# Patient Record
Sex: FEMALE | Race: WHITE | NOT HISPANIC OR LATINO | URBAN - METROPOLITAN AREA
[De-identification: names, ages, dates, MRNs, and addresses within clinical notes are randomized per-mention and may not be internally consistent; named-entity substitution may affect disease eponyms.]

---

## 2020-06-10 ENCOUNTER — OFFICE VISIT (OUTPATIENT)
Dept: URBAN - METROPOLITAN AREA CLINIC 113 | Facility: CLINIC | Age: 60
End: 2020-06-10

## 2020-06-15 ENCOUNTER — OFFICE VISIT (OUTPATIENT)
Dept: URBAN - METROPOLITAN AREA CLINIC 13 | Facility: CLINIC | Age: 60
End: 2020-06-15

## 2020-06-17 ENCOUNTER — OFFICE VISIT (OUTPATIENT)
Dept: URBAN - METROPOLITAN AREA CLINIC 113 | Facility: CLINIC | Age: 60
End: 2020-06-17

## 2020-06-26 ENCOUNTER — OFFICE VISIT (OUTPATIENT)
Dept: URBAN - METROPOLITAN AREA CLINIC 13 | Facility: CLINIC | Age: 60
End: 2020-06-26

## 2020-07-25 ENCOUNTER — TELEPHONE ENCOUNTER (OUTPATIENT)
Dept: URBAN - METROPOLITAN AREA CLINIC 13 | Facility: CLINIC | Age: 60
End: 2020-07-25

## 2020-07-25 RX ORDER — POLYETHYLENE GLYCOL 3350, SODIUM CHLORIDE, SODIUM BICARBONATE AND POTASSIUM CHLORIDE WITH LEMON FLAVOR 420; 11.2; 5.72; 1.48 G/4L; G/4L; G/4L; G/4L
DRINK 32 OUNCES AT 5PM DAY BEFORE PROCEDURE AND 6 HOURS PRIOR POWDER, FOR SOLUTION ORAL
Qty: 1 | Refills: 0 | OUTPATIENT
Start: 2013-11-06 | End: 2013-12-02

## 2020-07-25 RX ORDER — LORAZEPAM 2 MG/1
TAKE 1 TABLET BEDTIME TABLET ORAL
Refills: 0 | OUTPATIENT
End: 2016-03-24

## 2020-07-25 RX ORDER — NORTRIPTYLINE HYDROCHLORIDE 75 MG/1
TAKE 1 CAPSULE DAILY CAPSULE ORAL
Refills: 0 | OUTPATIENT
End: 2016-06-07

## 2020-07-25 RX ORDER — OXYCODONE HYDROCHLORIDE AND ACETAMINOPHEN 10; 325 MG/1; MG/1
TAKE 1 TABLET 4 TIMES DAILY AS NEEDED FOR PAIN TABLET ORAL
Refills: 0 | OUTPATIENT
End: 2020-06-15

## 2020-07-25 RX ORDER — PREDNISONE 20 MG/1
TAKE 2 TABLETS EVERY 8 HOURS X 3 DOSES; LAST DOSE DUE 1 HOUR PRIOR TO STUDY TABLET ORAL
Qty: 6 | Refills: 0 | OUTPATIENT
Start: 2019-01-21 | End: 2020-06-15

## 2020-07-25 RX ORDER — MELOXICAM 15 MG/1
TABLET ORAL
Qty: 10 | Refills: 0 | OUTPATIENT
Start: 2013-12-08 | End: 2016-03-24

## 2020-07-25 RX ORDER — LORAZEPAM 2 MG/1
TAKE 1 TABLET BEDTIME TABLET ORAL
Refills: 0 | OUTPATIENT
End: 2018-08-31

## 2020-07-25 RX ORDER — NYSTATIN 100000 [USP'U]/G
POWDER TOPICAL
Qty: 60 | Refills: 0 | OUTPATIENT
Start: 2018-11-12 | End: 2020-06-15

## 2020-07-25 RX ORDER — HYDROCORTISONE ACETATE 25 MG/1
INSERT 1 SUPP BEDTIME FOR 5-7 DAYS SUPPOSITORY RECTAL
Qty: 12 | Refills: 1 | OUTPATIENT
Start: 2017-03-28 | End: 2018-08-31

## 2020-07-25 RX ORDER — OXCARBAZEPINE 150 MG
TAKE 1 TABLET 3 TIMES DAILY TABLET ORAL
Refills: 0 | OUTPATIENT
End: 2014-02-11

## 2020-07-25 RX ORDER — LUBIPROSTONE 24 UG/1
TAKE (1) CAPSULE BY MOUTH TWICE DAILY WITH FOOD CAPSULE, GELATIN COATED ORAL
Qty: 60 | Refills: 11 | OUTPATIENT
Start: 2018-08-31 | End: 2020-06-15

## 2020-07-25 RX ORDER — GUANFACINE 4 MG/1
TABLET, EXTENDED RELEASE ORAL
Qty: 30 | Refills: 0 | OUTPATIENT
Start: 2014-01-22 | End: 2016-03-24

## 2020-07-25 RX ORDER — HYOSCYAMINE SULFATE 0.12 MG/1
PLACE 1-2 TABLET EVERY 6 HOURS PRN ABD PAIN TABLET, ORALLY DISINTEGRATING ORAL
Qty: 45 | Refills: 4 | OUTPATIENT
Start: 2013-12-02 | End: 2016-03-24

## 2020-07-25 RX ORDER — LUBIPROSTONE 24 UG/1
TAKE (1) CAPSULE BY MOUTH TWICE DAILY CAPSULE, GELATIN COATED ORAL
Qty: 60 | Refills: 1 | OUTPATIENT
Start: 2016-03-25 | End: 2018-08-31

## 2020-07-25 RX ORDER — TRAZODONE HYDROCHLORIDE 300 MG/1
TAKE 1 TABLET EVERY 12 HOURS DAILY TABLET ORAL
Refills: 0 | OUTPATIENT
Start: 2009-06-09 | End: 2013-10-01

## 2020-07-25 RX ORDER — PREGABALIN 100 MG
TAKE 1 CAPSULE AT BEDTIME CAPSULE ORAL
Refills: 0 | OUTPATIENT
End: 2016-03-24

## 2020-07-25 RX ORDER — POLYETHYLENE GLYCOL 3350, SODIUM CHLORIDE, SODIUM BICARBONATE AND POTASSIUM CHLORIDE WITH LEMON FLAVOR 420; 11.2; 5.72; 1.48 G/4L; G/4L; G/4L; G/4L
TAKE AS DIRECTED POWDER, FOR SOLUTION ORAL
Qty: 1 | Refills: 0 | OUTPATIENT
Start: 2020-03-24 | End: 2020-06-15

## 2020-07-25 RX ORDER — UMECLIDINIUM BROMIDE AND VILANTEROL TRIFENATATE 62.5; 25 UG/1; UG/1
INHALE 1 PUFFS TWICE DAILY POWDER RESPIRATORY (INHALATION)
Refills: 0 | OUTPATIENT
Start: 2019-04-02 | End: 2020-06-15

## 2020-07-25 RX ORDER — HYOSCYAMINE SULFATE 0.12 MG/1
PLACE 1 TABLET EVERY 4 HOURS PRN ABD PAIN TABLET, ORALLY DISINTEGRATING ORAL
Qty: 40 | Refills: 6 | OUTPATIENT
Start: 2014-02-17 | End: 2016-03-24

## 2020-07-25 RX ORDER — CLONAZEPAM 1 MG/1
TAKE 2 TABLETS AT BEDTIME TABLET ORAL
Refills: 0 | OUTPATIENT
End: 2016-06-07

## 2020-07-25 RX ORDER — DOCUSATE SODIUM 100 MG/1
TAKE 1 CAPSULE DAILY CAPSULE ORAL
Refills: 0 | OUTPATIENT
End: 2017-02-02

## 2020-07-25 RX ORDER — DIPHENHYDRAMINE HYDROCHLORIDE 25 MG/1
TAKE 1 HOUR PRIOR TO STUDY TABLET, FILM COATED ORAL
Qty: 1 | Refills: 0 | OUTPATIENT
Start: 2019-01-21 | End: 2020-06-15

## 2020-07-25 RX ORDER — NORTRIPTYLINE HCL 25 MG
TAKE CAPSULE  50 MG DAYTIME 25 MG BEDTIME CAPSULE ORAL
Refills: 0 | OUTPATIENT
End: 2020-06-15

## 2020-07-25 RX ORDER — BUDESONIDE AND FORMOTEROL FUMARATE DIHYDRATE 160; 4.5 UG/1; UG/1
INHALE 2 PUFFS TWICE DAILY. RINSE MOUTH AFTER USE AEROSOL RESPIRATORY (INHALATION)
Refills: 0 | OUTPATIENT
Start: 2018-03-30 | End: 2020-06-15

## 2020-07-25 RX ORDER — LINACLOTIDE 290 UG/1
TAKE 1 CAPSULE DAILY CAPSULE, GELATIN COATED ORAL
Qty: 30 | Refills: 5 | OUTPATIENT
Start: 2020-03-24 | End: 2020-06-17

## 2020-07-25 RX ORDER — ESOMEPRAZOLE MAGNESIUM 40 MG
TAKE 1 CAPSULE DAILY CAPSULE,DELAYED RELEASE (ENTERIC COATED) ORAL
Qty: 30 | Refills: 2 | OUTPATIENT
Start: 2013-10-01 | End: 2016-03-24

## 2020-07-25 RX ORDER — TRIAMCINOLONE ACETONIDE 1 MG/G
CREAM TOPICAL
Qty: 454 | Refills: 0 | OUTPATIENT
Start: 2018-12-12 | End: 2020-06-15

## 2020-07-25 RX ORDER — HYDROCODONE/ACETAMINOPHEN 10MG-325MG
TAKE 1 TABLET EVERY 6 HOURS AS NEEDED FOR PAIN TABLET ORAL
Refills: 0 | OUTPATIENT
End: 2016-03-24

## 2020-07-26 ENCOUNTER — TELEPHONE ENCOUNTER (OUTPATIENT)
Dept: URBAN - METROPOLITAN AREA CLINIC 13 | Facility: CLINIC | Age: 60
End: 2020-07-26

## 2020-07-26 RX ORDER — OFLOXACIN 3 MG/ML
SOLUTION AURICULAR (OTIC)
Qty: 5 | Refills: 0 | Status: ACTIVE | COMMUNITY
Start: 2013-01-08

## 2020-07-26 RX ORDER — FLUCONAZOLE 150 MG/1
TABLET ORAL
Qty: 1 | Refills: 0 | Status: ACTIVE | COMMUNITY
Start: 2012-12-04

## 2020-07-26 RX ORDER — MONTELUKAST SODIUM 10 MG/1
TAKE 1 TABLET DAILY TABLET, FILM COATED ORAL
Refills: 0 | Status: ACTIVE | COMMUNITY
Start: 2018-10-31

## 2020-07-26 RX ORDER — LURASIDONE HYDROCHLORIDE 40 MG/1
TABLET, FILM COATED ORAL
Qty: 30 | Refills: 0 | Status: ACTIVE | COMMUNITY
Start: 2013-04-09

## 2020-07-26 RX ORDER — FLUTICASONE FUROATE, UMECLIDINIUM BROMIDE AND VILANTEROL TRIFENATATE 100; 62.5; 25 UG/1; UG/1; UG/1
INHALE 1 PUFFS DAILY POWDER RESPIRATORY (INHALATION)
Refills: 0 | Status: ACTIVE | COMMUNITY

## 2020-07-26 RX ORDER — OXYCODONE AND ACETAMINOPHEN 7.5; 325 MG/1; MG/1
TABLET ORAL
Qty: 45 | Refills: 0 | Status: ACTIVE | COMMUNITY
Start: 2013-10-09

## 2020-07-26 RX ORDER — BACLOFEN 10 MG/1
TAKE 1 TABLET 3 TIMES DAILY TABLET ORAL
Refills: 0 | Status: ACTIVE | COMMUNITY
Start: 2018-10-01

## 2020-07-26 RX ORDER — MELOXICAM 15 MG/1
TABLET ORAL
Qty: 10 | Refills: 0 | Status: ACTIVE | COMMUNITY
Start: 2013-12-08

## 2020-07-26 RX ORDER — PREDNISOLONE ACETATE 10 MG/ML
SUSPENSION/ DROPS OPHTHALMIC
Qty: 5 | Refills: 0 | Status: ACTIVE | COMMUNITY
Start: 2013-01-08

## 2020-07-26 RX ORDER — CLONAZEPAM 1 MG/1
TABLET ORAL
Qty: 30 | Refills: 0 | Status: ACTIVE | COMMUNITY
Start: 2013-01-31

## 2020-07-26 RX ORDER — PREGABALIN 150 MG
CAPSULE ORAL
Qty: 30 | Refills: 0 | Status: ACTIVE | COMMUNITY
Start: 2013-06-04

## 2020-07-26 RX ORDER — DOXYCYCLINE 100 MG/1
CAPSULE ORAL
Qty: 14 | Refills: 0 | Status: ACTIVE | COMMUNITY
Start: 2012-10-16

## 2020-07-26 RX ORDER — LEVOTHYROXINE SODIUM 0.07 MG/1
TABLET ORAL
Qty: 15 | Refills: 0 | Status: ACTIVE | COMMUNITY
Start: 2018-11-01

## 2020-07-26 RX ORDER — HYDROCORTISONE 20 MG/1
TAKE 1 TABLET TWICE DAILY TABLET ORAL
Refills: 0 | Status: ACTIVE | COMMUNITY

## 2020-07-26 RX ORDER — PREGABALIN 150 MG
CAPSULE ORAL
Qty: 30 | Refills: 0 | Status: ACTIVE | COMMUNITY
Start: 2012-11-30

## 2020-07-26 RX ORDER — AMOXICILLIN 500 MG/1
CAPSULE ORAL
Qty: 30 | Refills: 0 | Status: ACTIVE | COMMUNITY
Start: 2013-01-10

## 2020-07-26 RX ORDER — ESZOPICLONE 3 MG/1
TABLET, COATED ORAL
Qty: 30 | Refills: 0 | Status: ACTIVE | COMMUNITY
Start: 2012-10-19

## 2020-07-26 RX ORDER — PREDNISONE 10 MG/1
TAKE 1 TABLET 3 TIMES DAILY THREE TABLETS A DAY FOR 3 DAYS LAST TAB 1 HOUR PRIOR TO EXAM TABLET ORAL
Qty: 10 | Refills: 0 | Status: ACTIVE | COMMUNITY
Start: 2020-06-22

## 2020-07-26 RX ORDER — DOXYCYCLINE HYCLATE 100 MG/1
TABLET ORAL
Qty: 14 | Refills: 0 | Status: ACTIVE | COMMUNITY
Start: 2013-01-28

## 2020-07-26 RX ORDER — PREGABALIN 150 MG
CAPSULE ORAL
Qty: 60 | Refills: 0 | Status: ACTIVE | COMMUNITY
Start: 2012-10-22

## 2020-07-26 RX ORDER — GABAPENTIN 300 MG/1
TAKE 1 CAPSULE 3 TIMES DAILY CAPSULE ORAL
Refills: 0 | Status: ACTIVE | COMMUNITY

## 2020-07-26 RX ORDER — DICLOFENAC SODIUM 1 MG/ML
SOLUTION OPHTHALMIC
Qty: 5 | Refills: 0 | Status: ACTIVE | COMMUNITY
Start: 2012-12-12

## 2020-07-26 RX ORDER — PREDNISONE 10 MG/1
TAKE 4 TABLETS DAILY TABLET ORAL
Qty: 120 | Refills: 11 | Status: ACTIVE | COMMUNITY

## 2020-07-26 RX ORDER — FAMOTIDINE 40 MG/1
TAKE ONE TABLET BY MOUTH EVERY NIGHT AT BEDTIME TABLET ORAL
Qty: 90 | Refills: 3 | Status: ACTIVE | COMMUNITY
Start: 2016-06-07

## 2020-07-26 RX ORDER — FLUTICASONE PROPIONATE 50 UG/1
SPRAY, METERED NASAL
Qty: 16 | Refills: 0 | Status: ACTIVE | COMMUNITY
Start: 2013-08-21

## 2020-07-26 RX ORDER — DICLOFENAC SODIUM 1 MG/ML
SOLUTION OPHTHALMIC
Qty: 5 | Refills: 0 | Status: ACTIVE | COMMUNITY
Start: 2013-01-08

## 2020-07-26 RX ORDER — CITALOPRAM 20 MG/1
TABLET ORAL
Qty: 30 | Refills: 0 | Status: ACTIVE | COMMUNITY
Start: 2018-11-20

## 2020-07-26 RX ORDER — BLOOD-GLUCOSE METER
USE AS DIRECTED EACH MISCELLANEOUS
Qty: 1 | Refills: 0 | Status: ACTIVE | COMMUNITY
Start: 2019-09-24

## 2020-07-26 RX ORDER — DOXYCYCLINE HYCLATE 100 MG/1
TABLET ORAL
Qty: 14 | Refills: 0 | Status: ACTIVE | COMMUNITY
Start: 2014-01-14

## 2020-07-26 RX ORDER — AMOXICILLIN AND CLAVULANATE POTASSIUM 875; 125 MG/1; MG/1
TABLET, FILM COATED ORAL
Qty: 20 | Refills: 0 | Status: ACTIVE | COMMUNITY
Start: 2012-11-30

## 2020-07-26 RX ORDER — TOPIRAMATE 100 MG/1
TABLET, FILM COATED ORAL
Qty: 60 | Refills: 0 | Status: ACTIVE | COMMUNITY
Start: 2013-01-31

## 2020-07-26 RX ORDER — GABAPENTIN 300 MG/1
CAPSULE ORAL
Qty: 90 | Refills: 0 | Status: ACTIVE | COMMUNITY
Start: 2012-08-17

## 2020-07-26 RX ORDER — OLANZAPINE 5 MG/1
TAKE 1 TABLET 4 TIMES DAILY TABLET ORAL
Refills: 0 | Status: ACTIVE | COMMUNITY

## 2020-07-26 RX ORDER — MORPHINE SULFATE 15 MG/1
TABLET, FILM COATED, EXTENDED RELEASE ORAL
Qty: 60 | Refills: 0 | Status: ACTIVE | COMMUNITY
Start: 2013-05-29

## 2020-07-26 RX ORDER — LORAZEPAM 1 MG/1
TABLET ORAL
Qty: 45 | Refills: 0 | Status: ACTIVE | COMMUNITY
Start: 2014-01-06

## 2020-07-26 RX ORDER — PREDNISOLONE ACETATE 10 MG/ML
SUSPENSION/ DROPS OPHTHALMIC
Qty: 10 | Refills: 0 | Status: ACTIVE | COMMUNITY
Start: 2012-12-12

## 2020-07-26 RX ORDER — BLOOD SUGAR DIAGNOSTIC
TEST TWO TIMES DAILY STRIP MISCELLANEOUS
Qty: 175 | Refills: 0 | Status: ACTIVE | COMMUNITY
Start: 2019-10-28

## 2020-07-26 RX ORDER — BUDESONIDE AND FORMOTEROL FUMARATE DIHYDRATE 160; 4.5 UG/1; UG/1
AEROSOL RESPIRATORY (INHALATION)
Qty: 10 | Refills: 0 | Status: ACTIVE | COMMUNITY
Start: 2012-09-25

## 2020-07-26 RX ORDER — ESZOPICLONE 3 MG/1
TABLET, COATED ORAL
Qty: 30 | Refills: 0 | Status: ACTIVE | COMMUNITY
Start: 2012-08-17

## 2020-07-26 RX ORDER — AMOXICILLIN 500 MG/1
CAPSULE ORAL
Qty: 14 | Refills: 0 | Status: ACTIVE | COMMUNITY
Start: 2013-01-08

## 2020-07-26 RX ORDER — POTASSIUM CHLORIDE 20 MEQ/1
TABLET, EXTENDED RELEASE ORAL
Qty: 30 | Refills: 0 | Status: ACTIVE | COMMUNITY
Start: 2012-06-12

## 2020-07-26 RX ORDER — GABAPENTIN 400 MG/1
CAPSULE ORAL
Qty: 150 | Refills: 0 | Status: ACTIVE | COMMUNITY
Start: 2014-01-06

## 2020-07-26 RX ORDER — OXYCODONE HCL/ACETAMINOPHEN 7.5-325 MG
TABLET ORAL
Qty: 60 | Refills: 0 | Status: ACTIVE | COMMUNITY
Start: 2013-06-14

## 2020-07-26 RX ORDER — LEVOFLOXACIN 500 MG/1
TABLET, FILM COATED ORAL
Qty: 10 | Refills: 0 | Status: ACTIVE | COMMUNITY
Start: 2019-02-25

## 2020-07-26 RX ORDER — OLANZAPINE 5 MG/1
TABLET, FILM COATED ORAL
Qty: 30 | Refills: 0 | Status: ACTIVE | COMMUNITY
Start: 2019-01-07

## 2020-07-26 RX ORDER — GUANFACINE 4 MG/1
TABLET, EXTENDED RELEASE ORAL
Qty: 30 | Refills: 0 | Status: ACTIVE | COMMUNITY
Start: 2012-10-22

## 2020-07-26 RX ORDER — HYDROCORTISONE 10 MG/1
TABLET ORAL
Qty: 60 | Refills: 0 | Status: ACTIVE | COMMUNITY
Start: 2018-10-29

## 2020-07-26 RX ORDER — SUCRALFATE 1 G/1
TAKE 1 TABLET EVERY 6 HOURS AS NEEDED FOR ABDOMINAL PAIN TABLET ORAL
Qty: 120 | Refills: 3 | Status: ACTIVE | COMMUNITY
Start: 2018-12-20

## 2020-07-26 RX ORDER — LEVOTHYROXINE SODIUM 0.15 MG/1
TAKE 1 TABLET DAILY TABLET ORAL
Refills: 0 | Status: ACTIVE | COMMUNITY

## 2020-07-26 RX ORDER — ALBUTEROL SULFATE 90 UG/1
AEROSOL, METERED RESPIRATORY (INHALATION)
Qty: 36 | Refills: 0 | Status: ACTIVE | COMMUNITY
Start: 2012-09-25

## 2020-07-26 RX ORDER — IPRATROPIUM BROMIDE AND ALBUTEROL 20; 100 UG/1; UG/1
SPRAY, METERED RESPIRATORY (INHALATION)
Qty: 12 | Refills: 0 | Status: ACTIVE | COMMUNITY
Start: 2013-03-06

## 2020-07-26 RX ORDER — OXCARBAZEPINE 600 MG/1
TABLET, FILM COATED ORAL
Qty: 90 | Refills: 0 | Status: ACTIVE | COMMUNITY
Start: 2012-10-19

## 2020-07-26 RX ORDER — ISOPROPYL ALCOHOL 70 ML/100ML
USE TWO TIMES DAILY SWAB TOPICAL
Qty: 200 | Refills: 0 | Status: ACTIVE | COMMUNITY
Start: 2019-10-28

## 2020-07-26 RX ORDER — OXCARBAZEPINE 150 MG/1
TABLET, FILM COATED ORAL
Qty: 150 | Refills: 0 | Status: ACTIVE | COMMUNITY
Start: 2013-09-27

## 2020-07-26 RX ORDER — CICLESONIDE 37 UG/1
AEROSOL, METERED NASAL
Qty: 6 | Refills: 0 | Status: ACTIVE | COMMUNITY
Start: 2013-08-21

## 2020-07-26 RX ORDER — OFLOXACIN 3 MG/ML
SOLUTION AURICULAR (OTIC)
Qty: 5 | Refills: 0 | Status: ACTIVE | COMMUNITY
Start: 2012-12-12

## 2020-07-26 RX ORDER — TOPIRAMATE 200 MG/1
TABLET ORAL
Qty: 60 | Refills: 0 | Status: ACTIVE | COMMUNITY
Start: 2013-02-15

## 2020-07-26 RX ORDER — TIOTROPIUM BROMIDE AND OLODATEROL 3.124; 2.736 UG/1; UG/1
INHALE 1 PUFFS DAILY SPRAY, METERED RESPIRATORY (INHALATION)
Refills: 0 | Status: ACTIVE | COMMUNITY
Start: 2018-03-30

## 2020-07-26 RX ORDER — DOXYCYCLINE 100 MG/1
CAPSULE ORAL
Qty: 10 | Refills: 0 | Status: ACTIVE | COMMUNITY
Start: 2012-12-03

## 2020-07-26 RX ORDER — PANTOPRAZOLE SODIUM 40 MG
TAKE 1 TABLET BY MOUTH TWICE DAILY TABLET, DELAYED RELEASE (ENTERIC COATED) ORAL
Qty: 180 | Refills: 3 | Status: ACTIVE | COMMUNITY
Start: 2016-04-21

## 2020-07-26 RX ORDER — DIPHENHYDRAMINE HYDROCHLORIDE 25 MG/1
TAKE 1 CAPSULE 3 TIMES DAILY 3 TIMES A DAY FOR 3 DAYS. TAKE THE LAST TAB 1 HOUR PRIOR TO EXAM CAPSULE ORAL
Qty: 10 | Refills: 0 | Status: ACTIVE | COMMUNITY
Start: 2020-06-22

## 2020-07-26 RX ORDER — METOPROLOL SUCCINATE 25 MG/1
TABLET, EXTENDED RELEASE ORAL
Qty: 30 | Refills: 0 | Status: ACTIVE | COMMUNITY
Start: 2013-10-23

## 2020-07-26 RX ORDER — MELOXICAM 7.5 MG/1
TABLET ORAL
Qty: 60 | Refills: 0 | Status: ACTIVE | COMMUNITY
Start: 2013-12-30

## 2020-07-26 RX ORDER — TOPIRAMATE 100 MG/1
TABLET, FILM COATED ORAL
Qty: 60 | Refills: 0 | Status: ACTIVE | COMMUNITY
Start: 2012-10-19

## 2020-07-26 RX ORDER — IPRATROPIUM BROMIDE AND ALBUTEROL 20; 100 UG/1; UG/1
SPRAY, METERED RESPIRATORY (INHALATION)
Qty: 36 | Refills: 0 | Status: ACTIVE | COMMUNITY
Start: 2013-07-30

## 2020-07-26 RX ORDER — LEVOFLOXACIN 750 MG/1
TABLET, FILM COATED ORAL
Qty: 5 | Refills: 0 | Status: ACTIVE | COMMUNITY
Start: 2019-02-08

## 2020-07-26 RX ORDER — MIRTAZAPINE 15 MG/1
TABLET, FILM COATED ORAL
Qty: 30 | Refills: 0 | Status: ACTIVE | COMMUNITY
Start: 2013-01-31

## 2020-07-26 RX ORDER — FUROSEMIDE 40 MG/1
TABLET ORAL
Qty: 30 | Refills: 0 | Status: ACTIVE | COMMUNITY
Start: 2012-06-12

## 2020-07-26 RX ORDER — CITALOPRAM HYDROBROMIDE 10 MG
TAKE 1 TABLET DAILY TABLET ORAL
Qty: 30 | Refills: 5 | Status: ACTIVE | COMMUNITY

## 2020-07-26 RX ORDER — BUDESONIDE AND FORMOTEROL FUMARATE DIHYDRATE 160; 4.5 UG/1; UG/1
AEROSOL RESPIRATORY (INHALATION)
Qty: 30 | Refills: 0 | Status: ACTIVE | COMMUNITY
Start: 2013-07-01

## 2020-07-26 RX ORDER — IPRATROPIUM BROMIDE AND ALBUTEROL 20; 100 UG/1; UG/1
SPRAY, METERED RESPIRATORY (INHALATION)
Qty: 20 | Refills: 0 | Status: ACTIVE | COMMUNITY
Start: 2013-07-01

## 2020-07-26 RX ORDER — GUANFACINE 4 MG/1
TABLET, EXTENDED RELEASE ORAL
Qty: 30 | Refills: 0 | Status: ACTIVE | COMMUNITY
Start: 2013-01-31

## 2020-07-26 RX ORDER — LORAZEPAM 2 MG/1
TABLET ORAL
Qty: 30 | Refills: 0 | Status: ACTIVE | COMMUNITY
Start: 2013-01-31

## 2020-07-26 RX ORDER — CEPHALEXIN 500 MG/1
CAPSULE ORAL
Qty: 28 | Refills: 0 | Status: ACTIVE | COMMUNITY
Start: 2013-05-13

## 2020-07-26 RX ORDER — FAMOTIDINE 20 MG/1
TAKE (1) TABLET BY MOUTH AT BEDTIME TABLET ORAL
Qty: 30 | Refills: 11 | Status: ACTIVE | COMMUNITY
Start: 2017-05-26

## 2020-07-26 RX ORDER — SULFAMETHOXAZOLE AND TRIMETHOPRIM 800; 160 MG/1; MG/1
TABLET ORAL
Qty: 20 | Refills: 0 | Status: ACTIVE | COMMUNITY
Start: 2019-03-04

## 2020-07-26 RX ORDER — FLUCONAZOLE 150 MG/1
TABLET ORAL
Qty: 2 | Refills: 0 | Status: ACTIVE | COMMUNITY
Start: 2013-01-10

## 2020-07-26 RX ORDER — HYDROCODONE BITARTRATE AND ACETAMINOPHEN 10; 325 MG/1; MG/1
TABLET ORAL
Qty: 90 | Refills: 0 | Status: ACTIVE | COMMUNITY
Start: 2012-07-30

## 2020-07-26 RX ORDER — AMLODIPINE BESYLATE 5 MG/1
TABLET ORAL
Qty: 30 | Refills: 0 | Status: ACTIVE | COMMUNITY
Start: 2013-01-10

## 2020-07-26 RX ORDER — OXYCODONE HYDROCHLORIDE 10 MG/1
TAKE 1 TABLET EVERY 4 HOURS PRN TABLET ORAL
Refills: 0 | Status: ACTIVE | COMMUNITY
Start: 2018-10-31

## 2020-07-26 RX ORDER — PREDNISONE 10 MG/1
TABLET ORAL
Qty: 15 | Refills: 0 | Status: ACTIVE | COMMUNITY
Start: 2013-10-24

## 2020-07-26 RX ORDER — LEVOTHYROXINE SODIUM 0.07 MG/1
TABLET ORAL
Qty: 15 | Refills: 0 | Status: ACTIVE | COMMUNITY
Start: 2019-01-22

## 2020-07-26 RX ORDER — GABAPENTIN 600 MG/1
TABLET, FILM COATED ORAL
Qty: 120 | Refills: 0 | Status: ACTIVE | COMMUNITY
Start: 2012-10-19

## 2020-07-26 RX ORDER — ALBUTEROL SULFATE 90 UG/1
INHALE 2 PUFFS 3 TIMES DAILY AS NEEDED AEROSOL, METERED RESPIRATORY (INHALATION)
Refills: 0 | Status: ACTIVE | COMMUNITY

## 2020-07-26 RX ORDER — FUROSEMIDE 40 MG/1
TAKE 1 TABLET DAILY TABLET ORAL
Refills: 0 | Status: ACTIVE | COMMUNITY

## 2020-08-13 ENCOUNTER — ERX REFILL RESPONSE (OUTPATIENT)
Age: 60
End: 2020-08-13

## 2020-08-13 RX ORDER — FAMOTIDINE 20 MG/1
TAKE (1) TABLET BY MOUTH AT BEDTIME TABLET ORAL
Qty: 30 | Refills: 0

## 2020-08-17 ENCOUNTER — OFFICE VISIT (OUTPATIENT)
Dept: URBAN - METROPOLITAN AREA CLINIC 113 | Facility: CLINIC | Age: 60
End: 2020-08-17

## 2020-08-31 ENCOUNTER — OFFICE VISIT (OUTPATIENT)
Dept: URBAN - METROPOLITAN AREA CLINIC 113 | Facility: CLINIC | Age: 60
End: 2020-08-31
Payer: COMMERCIAL

## 2020-08-31 VITALS
HEIGHT: 68 IN | RESPIRATION RATE: 18 BRPM | HEART RATE: 82 BPM | BODY MASS INDEX: 36.68 KG/M2 | SYSTOLIC BLOOD PRESSURE: 113 MMHG | TEMPERATURE: 97.7 F | WEIGHT: 242 LBS | DIASTOLIC BLOOD PRESSURE: 74 MMHG

## 2020-08-31 DIAGNOSIS — K59.00 ACUTE CONSTIPATION: ICD-10-CM

## 2020-08-31 PROCEDURE — 99213 OFFICE O/P EST LOW 20 MIN: CPT | Performed by: NURSE PRACTITIONER

## 2020-08-31 PROCEDURE — G8427 DOCREV CUR MEDS BY ELIG CLIN: HCPCS | Performed by: NURSE PRACTITIONER

## 2020-08-31 RX ORDER — DOXYCYCLINE HYCLATE 100 MG/1
TABLET ORAL
Qty: 14 | Refills: 0 | Status: DISCONTINUED | COMMUNITY
Start: 2013-01-28

## 2020-08-31 RX ORDER — HYDROCORTISONE 10 MG/1
TABLET ORAL
Qty: 60 | Refills: 0 | Status: DISCONTINUED | COMMUNITY
Start: 2018-10-29

## 2020-08-31 RX ORDER — FAMOTIDINE 20 MG/1
TAKE (1) TABLET BY MOUTH AT BEDTIME TABLET ORAL
Qty: 30 | Refills: 0 | Status: DISCONTINUED | COMMUNITY

## 2020-08-31 RX ORDER — LURASIDONE HYDROCHLORIDE 40 MG/1
TABLET, FILM COATED ORAL
Qty: 30 | Refills: 0 | Status: DISCONTINUED | COMMUNITY
Start: 2013-04-09

## 2020-08-31 RX ORDER — METOPROLOL SUCCINATE 25 MG/1
TABLET, EXTENDED RELEASE ORAL
Qty: 30 | Refills: 0 | Status: DISCONTINUED | COMMUNITY
Start: 2013-10-23

## 2020-08-31 RX ORDER — FUROSEMIDE 40 MG/1
TAKE 1 TABLET DAILY TABLET ORAL
Refills: 0 | Status: DISCONTINUED | COMMUNITY

## 2020-08-31 RX ORDER — MELOXICAM 7.5 MG/1
TABLET ORAL
Qty: 60 | Refills: 0 | Status: DISCONTINUED | COMMUNITY
Start: 2013-12-30

## 2020-08-31 RX ORDER — SULFAMETHOXAZOLE AND TRIMETHOPRIM 800; 160 MG/1; MG/1
TABLET ORAL
Qty: 20 | Refills: 0 | Status: DISCONTINUED | COMMUNITY
Start: 2019-03-04

## 2020-08-31 RX ORDER — CEPHALEXIN 500 MG/1
CAPSULE ORAL
Qty: 28 | Refills: 0 | Status: DISCONTINUED | COMMUNITY
Start: 2013-05-13

## 2020-08-31 RX ORDER — PREDNISONE 10 MG/1
1 TABLET TABLET ORAL ONCE A DAY
Refills: 11 | Status: ACTIVE | COMMUNITY

## 2020-08-31 RX ORDER — LINACLOTIDE 290 UG/1
TAKE 1 CAPSULE DAILY CAPSULE, GELATIN COATED ORAL ONCE A DAY
Status: ACTIVE | COMMUNITY

## 2020-08-31 RX ORDER — LEVOFLOXACIN 500 MG/1
TABLET, FILM COATED ORAL
Qty: 10 | Refills: 0 | Status: DISCONTINUED | COMMUNITY
Start: 2019-02-25

## 2020-08-31 RX ORDER — MORPHINE SULFATE 15 MG/1
TABLET, FILM COATED, EXTENDED RELEASE ORAL
Qty: 60 | Refills: 0 | Status: DISCONTINUED | COMMUNITY
Start: 2013-05-29

## 2020-08-31 RX ORDER — SUCRALFATE 1 G/1
1 TABLET ON AN EMPTY STOMACH TABLET ORAL TWICE A DAY
Refills: 3 | Status: ACTIVE | COMMUNITY
Start: 2018-12-20

## 2020-08-31 RX ORDER — LEVOFLOXACIN 750 MG/1
TABLET, FILM COATED ORAL
Qty: 5 | Refills: 0 | Status: DISCONTINUED | COMMUNITY
Start: 2019-02-08

## 2020-08-31 RX ORDER — BLOOD SUGAR DIAGNOSTIC
TEST TWO TIMES DAILY STRIP MISCELLANEOUS
Qty: 175 | Refills: 0 | Status: DISCONTINUED | COMMUNITY
Start: 2019-10-28

## 2020-08-31 RX ORDER — CITALOPRAM HYDROBROMIDE 10 MG
1 TABLET TABLET ORAL ONCE A DAY
Refills: 5 | Status: ACTIVE | COMMUNITY

## 2020-08-31 RX ORDER — OFLOXACIN 3 MG/ML
SOLUTION AURICULAR (OTIC)
Qty: 5 | Refills: 0 | Status: DISCONTINUED | COMMUNITY
Start: 2012-12-12

## 2020-08-31 RX ORDER — ESZOPICLONE 3 MG/1
TABLET, COATED ORAL
Qty: 30 | Refills: 0 | Status: DISCONTINUED | COMMUNITY
Start: 2012-08-17

## 2020-08-31 RX ORDER — TOPIRAMATE 200 MG/1
TABLET ORAL
Qty: 60 | Refills: 0 | Status: DISCONTINUED | COMMUNITY
Start: 2013-02-15

## 2020-08-31 RX ORDER — MONTELUKAST SODIUM 10 MG/1
TAKE 1 TABLET DAILY TABLET, FILM COATED ORAL
Refills: 0 | Status: ACTIVE | COMMUNITY
Start: 2018-10-31

## 2020-08-31 RX ORDER — OXYCODONE AND ACETAMINOPHEN 7.5; 325 MG/1; MG/1
TABLET ORAL
Qty: 45 | Refills: 0 | Status: DISCONTINUED | COMMUNITY
Start: 2013-10-09

## 2020-08-31 RX ORDER — OLANZAPINE 5 MG/1
TAKE 1 TABLET 4 TIMES DAILY TABLET ORAL
Refills: 0 | Status: ACTIVE | COMMUNITY

## 2020-08-31 RX ORDER — PREDNISOLONE ACETATE 10 MG/ML
SUSPENSION/ DROPS OPHTHALMIC
Qty: 10 | Refills: 0 | Status: DISCONTINUED | COMMUNITY
Start: 2012-12-12

## 2020-08-31 RX ORDER — FLUTICASONE FUROATE, UMECLIDINIUM BROMIDE AND VILANTEROL TRIFENATATE 100; 62.5; 25 UG/1; UG/1; UG/1
1 PUFF POWDER RESPIRATORY (INHALATION) ONCE A DAY
Refills: 0 | Status: ACTIVE | COMMUNITY

## 2020-08-31 RX ORDER — GABAPENTIN 400 MG/1
CAPSULE ORAL
Qty: 150 | Refills: 0 | Status: DISCONTINUED | COMMUNITY
Start: 2014-01-06

## 2020-08-31 RX ORDER — GABAPENTIN 300 MG/1
CAPSULE ORAL
Qty: 90 | Refills: 0 | Status: DISCONTINUED | COMMUNITY
Start: 2012-08-17

## 2020-08-31 RX ORDER — OXCARBAZEPINE 150 MG/1
TABLET, FILM COATED ORAL
Qty: 150 | Refills: 0 | Status: DISCONTINUED | COMMUNITY
Start: 2013-09-27

## 2020-08-31 RX ORDER — FLUTICASONE PROPIONATE 50 UG/1
1 SPRAY IN EACH NOSTRIL SPRAY, METERED NASAL ONCE A DAY
Refills: 0 | Status: ACTIVE | COMMUNITY
Start: 2013-08-21

## 2020-08-31 RX ORDER — ISOPROPYL ALCOHOL 70 ML/100ML
USE TWO TIMES DAILY SWAB TOPICAL
Qty: 200 | Refills: 0 | Status: DISCONTINUED | COMMUNITY
Start: 2019-10-28

## 2020-08-31 RX ORDER — MOMETASONE FUROATE AND FORMOTEROL FUMARATE DIHYDRATE 200; 5 UG/1; UG/1
2 PUFFS AEROSOL RESPIRATORY (INHALATION) TWICE A DAY
Status: ACTIVE | COMMUNITY

## 2020-08-31 RX ORDER — BUDESONIDE AND FORMOTEROL FUMARATE DIHYDRATE 160; 4.5 UG/1; UG/1
AEROSOL RESPIRATORY (INHALATION)
Qty: 10 | Refills: 0 | Status: DISCONTINUED | COMMUNITY
Start: 2012-09-25

## 2020-08-31 RX ORDER — CLONAZEPAM 1 MG/1
TABLET ORAL
Qty: 30 | Refills: 0 | Status: DISCONTINUED | COMMUNITY
Start: 2013-01-31

## 2020-08-31 RX ORDER — GUANFACINE 4 MG/1
TABLET, EXTENDED RELEASE ORAL
Qty: 30 | Refills: 0 | Status: DISCONTINUED | COMMUNITY
Start: 2012-10-22

## 2020-08-31 RX ORDER — AMOXICILLIN AND CLAVULANATE POTASSIUM 875; 125 MG/1; MG/1
TABLET, FILM COATED ORAL
Qty: 20 | Refills: 0 | Status: DISCONTINUED | COMMUNITY
Start: 2012-11-30

## 2020-08-31 RX ORDER — NORTRIPTYLINE HYDROCHLORIDE 25 MG/1
1 CAPSULE CAPSULE ORAL ONCE A DAY
Status: ACTIVE | COMMUNITY

## 2020-08-31 RX ORDER — OXYCODONE HCL/ACETAMINOPHEN 7.5-325 MG
TABLET ORAL
Qty: 60 | Refills: 0 | Status: DISCONTINUED | COMMUNITY
Start: 2013-06-14

## 2020-08-31 RX ORDER — LORAZEPAM 2 MG/1
TABLET ORAL
Qty: 30 | Refills: 0 | Status: DISCONTINUED | COMMUNITY
Start: 2013-01-31

## 2020-08-31 RX ORDER — DICLOFENAC SODIUM 1 MG/ML
SOLUTION OPHTHALMIC
Qty: 5 | Refills: 0 | Status: DISCONTINUED | COMMUNITY
Start: 2012-12-12

## 2020-08-31 RX ORDER — HYDROCORTISONE 20 MG/1
TAKE 1 TABLET TWICE DAILY TABLET ORAL
Refills: 0 | Status: DISCONTINUED | COMMUNITY

## 2020-08-31 RX ORDER — FAMOTIDINE 40 MG/1
1 1/2 TABLETS TABLET ORAL ONCE A DAY
Refills: 3 | Status: ACTIVE | COMMUNITY
Start: 2016-06-07

## 2020-08-31 RX ORDER — POTASSIUM CHLORIDE 20 MEQ/1
TABLET, EXTENDED RELEASE ORAL
Qty: 30 | Refills: 0 | Status: DISCONTINUED | COMMUNITY
Start: 2012-06-12

## 2020-08-31 RX ORDER — HYDROCODONE BITARTRATE AND ACETAMINOPHEN 10; 325 MG/1; MG/1
TABLET ORAL
Qty: 90 | Refills: 0 | Status: DISCONTINUED | COMMUNITY
Start: 2012-07-30

## 2020-08-31 RX ORDER — LEVOTHYROXINE SODIUM 0.07 MG/1
TABLET ORAL
Qty: 15 | Refills: 0 | Status: DISCONTINUED | COMMUNITY
Start: 2018-11-01

## 2020-08-31 RX ORDER — PANTOPRAZOLE SODIUM 40 MG
1 TABLET TABLET, DELAYED RELEASE (ENTERIC COATED) ORAL
Refills: 3 | Status: ACTIVE | COMMUNITY
Start: 2016-04-21

## 2020-08-31 RX ORDER — IPRATROPIUM BROMIDE AND ALBUTEROL 20; 100 UG/1; UG/1
SPRAY, METERED RESPIRATORY (INHALATION)
Qty: 12 | Refills: 0 | Status: DISCONTINUED | COMMUNITY
Start: 2013-03-06

## 2020-08-31 RX ORDER — AMLODIPINE BESYLATE 5 MG/1
TABLET ORAL
Qty: 30 | Refills: 0 | Status: DISCONTINUED | COMMUNITY
Start: 2013-01-10

## 2020-08-31 RX ORDER — CICLESONIDE 37 UG/1
AEROSOL, METERED NASAL
Qty: 6 | Refills: 0 | Status: DISCONTINUED | COMMUNITY
Start: 2013-08-21

## 2020-08-31 RX ORDER — DOXYCYCLINE 100 MG/1
CAPSULE ORAL
Qty: 14 | Refills: 0 | Status: DISCONTINUED | COMMUNITY
Start: 2012-10-16

## 2020-08-31 RX ORDER — OXCARBAZEPINE 600 MG/1
TABLET, FILM COATED ORAL
Qty: 90 | Refills: 0 | Status: DISCONTINUED | COMMUNITY
Start: 2012-10-19

## 2020-08-31 RX ORDER — TOPIRAMATE 100 MG/1
TABLET, FILM COATED ORAL
Qty: 60 | Refills: 0 | Status: DISCONTINUED | COMMUNITY
Start: 2012-10-19

## 2020-08-31 RX ORDER — OXYCODONE HYDROCHLORIDE 10 MG/1
TAKE 1 TABLET EVERY 4 HOURS PRN TABLET ORAL
Refills: 0 | Status: ACTIVE | COMMUNITY
Start: 2018-10-31

## 2020-08-31 RX ORDER — CITALOPRAM 20 MG/1
TABLET ORAL
Qty: 30 | Refills: 0 | Status: DISCONTINUED | COMMUNITY
Start: 2018-11-20

## 2020-08-31 RX ORDER — FLUCONAZOLE 150 MG/1
TABLET ORAL
Qty: 1 | Refills: 0 | Status: DISCONTINUED | COMMUNITY
Start: 2012-12-04

## 2020-08-31 RX ORDER — MELOXICAM 15 MG/1
TABLET ORAL
Qty: 10 | Refills: 0 | Status: DISCONTINUED | COMMUNITY
Start: 2013-12-08

## 2020-08-31 RX ORDER — BACLOFEN 10 MG/1
TAKE 1 TABLET 3 TIMES DAILY TABLET ORAL
Refills: 0 | Status: ACTIVE | COMMUNITY
Start: 2018-10-01

## 2020-08-31 RX ORDER — OLANZAPINE 15 MG/1
1 TABLET TABLET, FILM COATED ORAL ONCE A DAY
Refills: 0 | Status: ACTIVE | COMMUNITY
Start: 2019-01-07

## 2020-08-31 RX ORDER — DICYCLOMINE HYDROCHLORIDE 20 MG/1
1 TABLET TABLET ORAL THREE TIMES A DAY
Status: ACTIVE | COMMUNITY

## 2020-08-31 RX ORDER — ALBUTEROL SULFATE 90 UG/1
INHALE 2 PUFFS 3 TIMES DAILY AS NEEDED AEROSOL, METERED RESPIRATORY (INHALATION)
Refills: 0 | Status: ACTIVE | COMMUNITY

## 2020-08-31 RX ORDER — SUCRALFATE 1 G
1 TABLET ON AN EMPTY STOMACH TABLET ORAL TWICE A DAY
Status: ACTIVE | COMMUNITY

## 2020-08-31 RX ORDER — FUROSEMIDE 40 MG/1
1 TABLET TABLET ORAL ONCE A DAY
Refills: 0 | Status: ACTIVE | COMMUNITY
Start: 2012-06-12

## 2020-08-31 RX ORDER — MIRTAZAPINE 15 MG/1
TABLET, FILM COATED ORAL
Qty: 30 | Refills: 0 | Status: DISCONTINUED | COMMUNITY
Start: 2013-01-31

## 2020-08-31 RX ORDER — DIPHENHYDRAMINE HYDROCHLORIDE 25 MG/1
TAKE 1 CAPSULE 3 TIMES DAILY 3 TIMES A DAY FOR 3 DAYS. TAKE THE LAST TAB 1 HOUR PRIOR TO EXAM CAPSULE ORAL
Qty: 10 | Refills: 0 | Status: DISCONTINUED | COMMUNITY
Start: 2020-06-22

## 2020-08-31 RX ORDER — NORTRIPTYLINE HYDROCHLORIDE 75 MG/1
1 CAPSULE CAPSULE ORAL ONCE A DAY
Status: ACTIVE | COMMUNITY

## 2020-08-31 RX ORDER — BLOOD-GLUCOSE METER
USE AS DIRECTED EACH MISCELLANEOUS
Qty: 1 | Refills: 0 | Status: DISCONTINUED | COMMUNITY
Start: 2019-09-24

## 2020-08-31 RX ORDER — AMOXICILLIN 500 MG/1
CAPSULE ORAL
Qty: 14 | Refills: 0 | Status: DISCONTINUED | COMMUNITY
Start: 2013-01-08

## 2020-08-31 RX ORDER — PREGABALIN 150 MG
CAPSULE ORAL
Qty: 60 | Refills: 0 | Status: DISCONTINUED | COMMUNITY
Start: 2012-10-22

## 2020-08-31 RX ORDER — LEVOTHYROXINE SODIUM 0.15 MG/1
TAKE 1 TABLET DAILY TABLET ORAL
Refills: 0 | Status: ACTIVE | COMMUNITY

## 2020-08-31 RX ORDER — LORAZEPAM 1 MG/1
TABLET ORAL
Qty: 45 | Refills: 0 | Status: DISCONTINUED | COMMUNITY
Start: 2014-01-06

## 2020-08-31 RX ORDER — GABAPENTIN 300 MG/1
TAKE 1 CAPSULE 3 TIMES DAILY CAPSULE ORAL
Refills: 0 | Status: ACTIVE | COMMUNITY

## 2020-08-31 RX ORDER — TIOTROPIUM BROMIDE AND OLODATEROL 3.124; 2.736 UG/1; UG/1
2 PUFFS SPRAY, METERED RESPIRATORY (INHALATION) ONCE A DAY
Refills: 0 | Status: ACTIVE | COMMUNITY
Start: 2018-03-30

## 2020-08-31 RX ORDER — GABAPENTIN 600 MG/1
TABLET, FILM COATED ORAL
Qty: 120 | Refills: 0 | Status: DISCONTINUED | COMMUNITY
Start: 2012-10-19

## 2020-08-31 NOTE — HPI-OTHER HISTORIES
Upper GI series was performed on 1/9/19 and revealed moderate gastroesophageal reflux. EGD 10/3/18: LA grade A reflux esophagitis at 39 cm status post biopsies revealing squamocolumnar mucosa with mild chronic inflammation negative for intestinal metaplasia or dysplasia. Small hiatal hernia. Status post empiric savory dilation to 48 Liberian. EGD otherwise normal to D2. Colonoscopy (3/23/17): BBPS score 9, a 3 mm ascending colon polyp, a 3 mm rectosigmoid colon polyp, significant looping of colon, moderate internal hemorrhoids.

## 2020-08-31 NOTE — HPI-TODAY'S VISIT:
59 year old female with a history of brain aneurysm x 2 with repair in 2015, adrenal insufficiency, thyroid nodules, GERD, Blanca's esophagus, adenomatous colon polyps, chronic constipation, gastroparesis, DM, COPD, PTSD, bipolar disorder, and panic attacks with agoraphobia, presenting for follow up.  She was last seen 6/17/20 for follow-up regarding constipation. Unfortunately, she could not tolerate Linzess due to the side effect of abdominal cramping, and Amitiza was both ineffective and cost-prohibitive. She was recommended a bowel regimen with MOM and MiraLAX. Given recent exacerbation of abdominal pain, labs and a CT were planned. Her GERD was managed with pantoprazole bid and famotidine prior to bedtime. CT of the abdomen and pelvis with contrast on 6/26/20: left adrenal adenoma, diverticulosis. Labs 6/25/20: CBC, CMP unremarkable. Lipase 15. TSH 0.73. Unfortunately, a daily regimen with milk of magnesia and MiraLAX was ineffective. She resumed Amitiza, which resulted in small-volume, incomplete stools. Subsequently, she reinitiated Linzess 290mcg daily, which initially was effective, resulting in a daily formed stool with infrequent diarrhea. However, within the last few weeks, she has experienced an exacerbation of constipation, and is having a hard, small-volume, straining stool every 3-4 days despite a regimen with Linzess 290mcg daily and milk of magnesia 2 tablespoons three times daily. She complains of postprandial upper abdominal bloating, fullness, and aching epigastric pain, which subside with defecation. She reports associated nausea without vomiting.

## 2020-09-17 ENCOUNTER — TELEPHONE ENCOUNTER (OUTPATIENT)
Dept: URBAN - METROPOLITAN AREA CLINIC 113 | Facility: CLINIC | Age: 60
End: 2020-09-17

## 2020-10-04 ENCOUNTER — ERX REFILL RESPONSE (OUTPATIENT)
Age: 60
End: 2020-10-04

## 2020-10-04 RX ORDER — SUCRALFATE 1 G/1
TAKE 1 TABLET EVERY 6 HOURS AS NEEDED FOR ABDOMINAL PAIN TABLET ORAL
Qty: 120 | Refills: 2

## 2020-10-13 ENCOUNTER — TELEPHONE ENCOUNTER (OUTPATIENT)
Dept: URBAN - METROPOLITAN AREA CLINIC 113 | Facility: CLINIC | Age: 60
End: 2020-10-13

## 2020-10-13 RX ORDER — LINACLOTIDE 290 UG/1
TAKE 1 CAPSULE DAILY CAPSULE, GELATIN COATED ORAL ONCE A DAY
Qty: 90 | Refills: 1

## 2020-12-01 ENCOUNTER — OFFICE VISIT (OUTPATIENT)
Dept: URBAN - METROPOLITAN AREA CLINIC 113 | Facility: CLINIC | Age: 60
End: 2020-12-01

## 2020-12-01 RX ORDER — SUCRALFATE 1 G/1
TAKE 1 TABLET EVERY 6 HOURS AS NEEDED FOR ABDOMINAL PAIN TABLET ORAL
Qty: 120 | Refills: 2 | Status: ACTIVE | COMMUNITY

## 2020-12-01 RX ORDER — LINACLOTIDE 290 UG/1
TAKE 1 CAPSULE DAILY CAPSULE, GELATIN COATED ORAL ONCE A DAY
Qty: 90 | Refills: 1 | Status: ACTIVE | COMMUNITY

## 2020-12-01 RX ORDER — SUCRALFATE 1 G
1 TABLET ON AN EMPTY STOMACH TABLET ORAL TWICE A DAY
Status: ACTIVE | COMMUNITY

## 2020-12-01 RX ORDER — BACLOFEN 10 MG/1
TAKE 1 TABLET 3 TIMES DAILY TABLET ORAL
Refills: 0 | Status: ACTIVE | COMMUNITY
Start: 2018-10-01

## 2020-12-01 RX ORDER — FAMOTIDINE 40 MG/1
1 1/2 TABLETS TABLET ORAL ONCE A DAY
Refills: 3 | Status: ACTIVE | COMMUNITY
Start: 2016-06-07

## 2020-12-01 RX ORDER — ALBUTEROL SULFATE 90 UG/1
INHALE 2 PUFFS 3 TIMES DAILY AS NEEDED AEROSOL, METERED RESPIRATORY (INHALATION)
Refills: 0 | Status: ACTIVE | COMMUNITY

## 2020-12-01 RX ORDER — PANTOPRAZOLE SODIUM 40 MG
1 TABLET TABLET, DELAYED RELEASE (ENTERIC COATED) ORAL
Refills: 3 | Status: ACTIVE | COMMUNITY
Start: 2016-04-21

## 2020-12-01 RX ORDER — CITALOPRAM HYDROBROMIDE 10 MG
1 TABLET TABLET ORAL ONCE A DAY
Refills: 5 | Status: ACTIVE | COMMUNITY

## 2020-12-01 RX ORDER — LEVOTHYROXINE SODIUM 0.15 MG/1
TAKE 1 TABLET DAILY TABLET ORAL
Refills: 0 | Status: ACTIVE | COMMUNITY

## 2020-12-01 RX ORDER — FLUTICASONE PROPIONATE 50 UG/1
1 SPRAY IN EACH NOSTRIL SPRAY, METERED NASAL ONCE A DAY
Refills: 0 | Status: ACTIVE | COMMUNITY
Start: 2013-08-21

## 2020-12-01 RX ORDER — MONTELUKAST SODIUM 10 MG/1
TAKE 1 TABLET DAILY TABLET, FILM COATED ORAL
Refills: 0 | Status: ACTIVE | COMMUNITY
Start: 2018-10-31

## 2020-12-01 RX ORDER — OXYCODONE HYDROCHLORIDE 10 MG/1
TAKE 1 TABLET EVERY 4 HOURS PRN TABLET ORAL
Refills: 0 | Status: ACTIVE | COMMUNITY
Start: 2018-10-31

## 2020-12-01 RX ORDER — DICYCLOMINE HYDROCHLORIDE 20 MG/1
1 TABLET TABLET ORAL THREE TIMES A DAY
Status: ACTIVE | COMMUNITY

## 2020-12-01 RX ORDER — NORTRIPTYLINE HYDROCHLORIDE 25 MG/1
1 CAPSULE CAPSULE ORAL ONCE A DAY
Status: ACTIVE | COMMUNITY

## 2020-12-01 RX ORDER — NORTRIPTYLINE HYDROCHLORIDE 75 MG/1
1 CAPSULE CAPSULE ORAL ONCE A DAY
Status: ACTIVE | COMMUNITY

## 2020-12-01 RX ORDER — OLANZAPINE 5 MG/1
TAKE 1 TABLET 4 TIMES DAILY TABLET ORAL
Refills: 0 | Status: ACTIVE | COMMUNITY

## 2020-12-01 RX ORDER — PREDNISONE 10 MG/1
1 TABLET TABLET ORAL ONCE A DAY
Refills: 11 | Status: ACTIVE | COMMUNITY

## 2020-12-01 RX ORDER — FLUTICASONE FUROATE, UMECLIDINIUM BROMIDE AND VILANTEROL TRIFENATATE 100; 62.5; 25 UG/1; UG/1; UG/1
1 PUFF POWDER RESPIRATORY (INHALATION) ONCE A DAY
Refills: 0 | Status: ACTIVE | COMMUNITY

## 2020-12-01 RX ORDER — TIOTROPIUM BROMIDE AND OLODATEROL 3.124; 2.736 UG/1; UG/1
2 PUFFS SPRAY, METERED RESPIRATORY (INHALATION) ONCE A DAY
Refills: 0 | Status: ACTIVE | COMMUNITY
Start: 2018-03-30

## 2020-12-01 RX ORDER — GABAPENTIN 300 MG/1
TAKE 1 CAPSULE 3 TIMES DAILY CAPSULE ORAL
Refills: 0 | Status: ACTIVE | COMMUNITY

## 2020-12-01 RX ORDER — MOMETASONE FUROATE AND FORMOTEROL FUMARATE DIHYDRATE 200; 5 UG/1; UG/1
2 PUFFS AEROSOL RESPIRATORY (INHALATION) TWICE A DAY
Status: ACTIVE | COMMUNITY

## 2020-12-01 RX ORDER — OLANZAPINE 15 MG/1
1 TABLET TABLET, FILM COATED ORAL ONCE A DAY
Refills: 0 | Status: ACTIVE | COMMUNITY
Start: 2019-01-07

## 2020-12-01 RX ORDER — FUROSEMIDE 40 MG/1
1 TABLET TABLET ORAL ONCE A DAY
Refills: 0 | Status: ACTIVE | COMMUNITY
Start: 2012-06-12

## 2021-02-05 ENCOUNTER — OFFICE VISIT (OUTPATIENT)
Dept: URBAN - METROPOLITAN AREA CLINIC 113 | Facility: CLINIC | Age: 61
End: 2021-02-05

## 2021-02-11 ENCOUNTER — TELEPHONE ENCOUNTER (OUTPATIENT)
Dept: URBAN - METROPOLITAN AREA CLINIC 113 | Facility: CLINIC | Age: 61
End: 2021-02-11

## 2021-02-11 RX ORDER — LINACLOTIDE 290 UG/1
TAKE 1 CAPSULE DAILY CAPSULE, GELATIN COATED ORAL ONCE A DAY
Refills: 1

## 2021-03-02 ENCOUNTER — OFFICE VISIT (OUTPATIENT)
Dept: URBAN - METROPOLITAN AREA CLINIC 113 | Facility: CLINIC | Age: 61
End: 2021-03-02
Payer: COMMERCIAL

## 2021-03-02 VITALS
TEMPERATURE: 98 F | DIASTOLIC BLOOD PRESSURE: 78 MMHG | SYSTOLIC BLOOD PRESSURE: 120 MMHG | WEIGHT: 224 LBS | HEIGHT: 68 IN | HEART RATE: 67 BPM | BODY MASS INDEX: 33.95 KG/M2

## 2021-03-02 DIAGNOSIS — K21.9 GERD: ICD-10-CM

## 2021-03-02 DIAGNOSIS — K59.01 SLOW TRANSIT CONSTIPATION: ICD-10-CM

## 2021-03-02 DIAGNOSIS — R13.14 PHARYNGOESOPHAGEAL DYSPHAGIA: ICD-10-CM

## 2021-03-02 PROBLEM — 35298007 SLOW TRANSIT CONSTIPATION: Status: ACTIVE | Noted: 2021-03-02

## 2021-03-02 PROCEDURE — 99214 OFFICE O/P EST MOD 30 MIN: CPT | Performed by: NURSE PRACTITIONER

## 2021-03-02 RX ORDER — SUCRALFATE 1 G
1 TABLET ON AN EMPTY STOMACH TABLET ORAL TWICE A DAY
Status: ON HOLD | COMMUNITY

## 2021-03-02 RX ORDER — FUROSEMIDE 40 MG/1
1 TABLET TABLET ORAL ONCE A DAY
Refills: 0 | Status: ACTIVE | COMMUNITY
Start: 2012-06-12

## 2021-03-02 RX ORDER — NORTRIPTYLINE HYDROCHLORIDE 25 MG/1
1 CAPSULE CAPSULE ORAL ONCE A DAY
Status: ACTIVE | COMMUNITY

## 2021-03-02 RX ORDER — OXYCODONE HYDROCHLORIDE 10 MG/1
TAKE 1 TABLET EVERY 4 HOURS PRN TABLET ORAL
Refills: 0 | Status: ACTIVE | COMMUNITY
Start: 2018-10-31

## 2021-03-02 RX ORDER — OLANZAPINE 15 MG/1
1 TABLET TABLET, FILM COATED ORAL ONCE A DAY
Refills: 0 | Status: ACTIVE | COMMUNITY
Start: 2019-01-07

## 2021-03-02 RX ORDER — ALBUTEROL SULFATE 90 UG/1
INHALE 2 PUFFS 3 TIMES DAILY AS NEEDED AEROSOL, METERED RESPIRATORY (INHALATION)
Refills: 0 | Status: ACTIVE | COMMUNITY

## 2021-03-02 RX ORDER — DICYCLOMINE HYDROCHLORIDE 20 MG/1
1 TABLET TABLET ORAL THREE TIMES A DAY
Status: ACTIVE | COMMUNITY

## 2021-03-02 RX ORDER — NORTRIPTYLINE HYDROCHLORIDE 75 MG/1
1 CAPSULE CAPSULE ORAL ONCE A DAY
Status: ACTIVE | COMMUNITY

## 2021-03-02 RX ORDER — FLUTICASONE FUROATE, UMECLIDINIUM BROMIDE AND VILANTEROL TRIFENATATE 100; 62.5; 25 UG/1; UG/1; UG/1
1 PUFF POWDER RESPIRATORY (INHALATION) ONCE A DAY
Refills: 0 | Status: ACTIVE | COMMUNITY

## 2021-03-02 RX ORDER — TIOTROPIUM BROMIDE AND OLODATEROL 3.124; 2.736 UG/1; UG/1
2 PUFFS SPRAY, METERED RESPIRATORY (INHALATION) ONCE A DAY
Refills: 0 | Status: ON HOLD | COMMUNITY
Start: 2018-03-30

## 2021-03-02 RX ORDER — LINACLOTIDE 290 UG/1
TAKE 1 CAPSULE DAILY CAPSULE, GELATIN COATED ORAL ONCE A DAY
Refills: 1 | Status: ACTIVE | COMMUNITY

## 2021-03-02 RX ORDER — BACLOFEN 10 MG/1
TAKE 1 TABLET 3 TIMES DAILY TABLET ORAL
Refills: 0 | Status: ON HOLD | COMMUNITY
Start: 2018-10-01

## 2021-03-02 RX ORDER — SUCRALFATE 1 G/1
TAKE 1 TABLET EVERY 6 HOURS AS NEEDED FOR ABDOMINAL PAIN TABLET ORAL
Qty: 120 | Refills: 2 | Status: ACTIVE | COMMUNITY

## 2021-03-02 RX ORDER — GABAPENTIN 300 MG/1
TAKE 1 CAPSULE 3 TIMES DAILY CAPSULE ORAL
Refills: 0 | Status: ACTIVE | COMMUNITY

## 2021-03-02 RX ORDER — PANTOPRAZOLE SODIUM 40 MG
1 TABLET TABLET, DELAYED RELEASE (ENTERIC COATED) ORAL
Refills: 3 | Status: ACTIVE | COMMUNITY
Start: 2016-04-21

## 2021-03-02 RX ORDER — MELOXICAM 15 MG/1
1 TABLET TABLET ORAL ONCE A DAY
Status: ACTIVE | COMMUNITY

## 2021-03-02 RX ORDER — MOMETASONE FUROATE AND FORMOTEROL FUMARATE DIHYDRATE 200; 5 UG/1; UG/1
2 PUFFS AEROSOL RESPIRATORY (INHALATION) TWICE A DAY
Status: ACTIVE | COMMUNITY

## 2021-03-02 RX ORDER — MONTELUKAST SODIUM 10 MG/1
TAKE 1 TABLET DAILY TABLET, FILM COATED ORAL
Refills: 0 | Status: ACTIVE | COMMUNITY
Start: 2018-10-31

## 2021-03-02 RX ORDER — FAMOTIDINE 40 MG/1
1 1/2 TABLETS TABLET ORAL ONCE A DAY
Refills: 3 | Status: ACTIVE | COMMUNITY
Start: 2016-06-07

## 2021-03-02 RX ORDER — CITALOPRAM HYDROBROMIDE 10 MG
1 TABLET TABLET ORAL ONCE A DAY
Refills: 5 | Status: DISCONTINUED | COMMUNITY

## 2021-03-02 RX ORDER — OLANZAPINE 5 MG/1
TAKE 1 TABLET 1 TIMES DAILY TABLET ORAL
Refills: 0 | Status: ACTIVE | COMMUNITY

## 2021-03-02 RX ORDER — PREDNISONE 10 MG/1
1 TABLET PRN TABLET ORAL ONCE A DAY
Refills: 11 | Status: ACTIVE | COMMUNITY

## 2021-03-02 RX ORDER — FLUTICASONE PROPIONATE 50 UG/1
1 SPRAY IN EACH NOSTRIL SPRAY, METERED NASAL ONCE A DAY
Refills: 0 | Status: ACTIVE | COMMUNITY
Start: 2013-08-21

## 2021-03-02 RX ORDER — LEVOTHYROXINE SODIUM 0.07 MG/1
TAKE 1 TABLET DAILY TABLET ORAL
Refills: 0 | Status: ACTIVE | COMMUNITY

## 2021-03-02 RX ORDER — OLANZAPINE 15 MG/1
1 TABLET TABLET ORAL ONCE A DAY
Status: ACTIVE | COMMUNITY

## 2021-03-02 NOTE — HPI-OTHER HISTORIES
CT of the abdomen and pelvis with contrast on 6/26/20: left adrenal adenoma, diverticulosis. Labs 6/25/20: CBC, CMP unremarkable. Lipase 15. TSH 0.73. Upper GI series was performed on 1/9/19 and revealed moderate gastroesophageal reflux. EGD 10/3/18: LA grade A reflux esophagitis at 39 cm status post biopsies revealing squamocolumnar mucosa with mild chronic inflammation negative for intestinal metaplasia or dysplasia. Small hiatal hernia. Status post empiric savory dilation to 48 Slovak. EGD otherwise normal to D2. Colonoscopy (3/23/17): BBPS score 9, a 3 mm ascending colon polyp, a 3 mm rectosigmoid colon polyp, significant looping of colon, moderate internal hemorrhoids.

## 2021-03-02 NOTE — HPI-TODAY'S VISIT:
59 year old female with a history of brain aneurysm x 2 with repair in 2015, adrenal insufficiency, thyroid nodules, GERD, Blanca's esophagus, adenomatous colon polyps, chronic constipation, gastroparesis, DM, COPD, PTSD, bipolar disorder, and panic attacks with agoraphobia, presenting for follow up. She was last seen 8/31/20 for follow-up regarding chronic constipation. She was instructed to complete a bowel cleanse with magnesium citrate, and resume a daily bowel regimen with Linzess 290mcg daily and milk of magnesia 2 tablespoons tid. Her constipation has improved. She is having a daily bowel movement without blood per rectum with Linzess 290mcg. She has not required the addition of MOM. She reports excess gas and abdominal cramping prior to a bowel movement, which subsides with defecation. She has chronic nausea without vomiting. Within the last few months, she has experienced progressive solid dysphagia. She indicates food or pills will become lodged midchest, which is painful. The episode will pass with time; she does not regurgitate. Her GERD symptoms are managed with pantoprazole and famotidine. She is taking Mobic daily following hip surgery in the fall.

## 2021-03-02 NOTE — PHYSICAL EXAM LUNGS:
no increased work of breathing or signs of respiratory distress, clear to auscultation bilaterally FEVER/CONGESTION

## 2021-03-03 ENCOUNTER — TELEPHONE ENCOUNTER (OUTPATIENT)
Dept: URBAN - METROPOLITAN AREA CLINIC 113 | Facility: CLINIC | Age: 61
End: 2021-03-03

## 2021-03-15 ENCOUNTER — TELEPHONE ENCOUNTER (OUTPATIENT)
Dept: URBAN - METROPOLITAN AREA CLINIC 113 | Facility: CLINIC | Age: 61
End: 2021-03-15

## 2021-03-15 RX ORDER — LINACLOTIDE 290 UG/1
TAKE 1 CAPSULE DAILY CAPSULE, GELATIN COATED ORAL ONCE A DAY
Qty: 90 | Refills: 1
End: 2021-09-11

## 2021-03-15 RX ORDER — SUCRALFATE 1 G/1
TAKE 1 TABLET EVERY 6 HOURS AS NEEDED FOR ABDOMINAL PAIN TABLET ORAL
Qty: 60 | Refills: 2
End: 2021-06-13

## 2021-03-17 ENCOUNTER — OFFICE VISIT (OUTPATIENT)
Dept: URBAN - METROPOLITAN AREA MEDICAL CENTER 19 | Facility: MEDICAL CENTER | Age: 61
End: 2021-03-17
Payer: COMMERCIAL

## 2021-03-17 DIAGNOSIS — K21.00 BILE REFLUX ESOPHAGITIS: ICD-10-CM

## 2021-03-17 DIAGNOSIS — K31.89 ACQUIRED DEFORMITY OF DUODENUM: ICD-10-CM

## 2021-03-17 DIAGNOSIS — K44.9 DIAPHRAGMATIC HERNIA WITHOUT OBSTRUCTION AND WITHOUT GANGRENE: ICD-10-CM

## 2021-03-17 DIAGNOSIS — R13.19 DYSPHAGIA CAUSING PULMONARY ASPIRATION WITH SWALLOWING: ICD-10-CM

## 2021-03-17 DIAGNOSIS — K22.2 ESOPHAGEAL STRICTURE: ICD-10-CM

## 2021-03-17 DIAGNOSIS — K25.9 STOMACH ULCER: ICD-10-CM

## 2021-03-17 PROCEDURE — 43248 EGD GUIDE WIRE INSERTION: CPT | Performed by: INTERNAL MEDICINE

## 2021-03-17 PROCEDURE — 43239 EGD BIOPSY SINGLE/MULTIPLE: CPT | Performed by: INTERNAL MEDICINE

## 2021-03-17 RX ORDER — MONTELUKAST SODIUM 10 MG/1
TAKE 1 TABLET DAILY TABLET, FILM COATED ORAL
Refills: 0 | Status: ACTIVE | COMMUNITY
Start: 2018-10-31

## 2021-03-17 RX ORDER — OLANZAPINE 15 MG/1
1 TABLET TABLET ORAL ONCE A DAY
Status: ACTIVE | COMMUNITY

## 2021-03-17 RX ORDER — LINACLOTIDE 290 UG/1
TAKE 1 CAPSULE DAILY CAPSULE, GELATIN COATED ORAL ONCE A DAY
Qty: 90 | Refills: 1 | Status: ACTIVE | COMMUNITY
End: 2021-09-11

## 2021-03-17 RX ORDER — GABAPENTIN 300 MG/1
TAKE 1 CAPSULE 3 TIMES DAILY CAPSULE ORAL
Refills: 0 | Status: ACTIVE | COMMUNITY

## 2021-03-17 RX ORDER — SUCRALFATE 1 G/1
TAKE 1 TABLET EVERY 6 HOURS AS NEEDED FOR ABDOMINAL PAIN TABLET ORAL
Qty: 60 | Refills: 2 | Status: ACTIVE | COMMUNITY
End: 2021-06-13

## 2021-03-17 RX ORDER — TIOTROPIUM BROMIDE AND OLODATEROL 3.124; 2.736 UG/1; UG/1
2 PUFFS SPRAY, METERED RESPIRATORY (INHALATION) ONCE A DAY
Refills: 0 | Status: ON HOLD | COMMUNITY
Start: 2018-03-30

## 2021-03-17 RX ORDER — ALBUTEROL SULFATE 90 UG/1
INHALE 2 PUFFS 3 TIMES DAILY AS NEEDED AEROSOL, METERED RESPIRATORY (INHALATION)
Refills: 0 | Status: ACTIVE | COMMUNITY

## 2021-03-17 RX ORDER — DICYCLOMINE HYDROCHLORIDE 20 MG/1
1 TABLET TABLET ORAL THREE TIMES A DAY
Status: ACTIVE | COMMUNITY

## 2021-03-17 RX ORDER — OLANZAPINE 15 MG/1
1 TABLET TABLET, FILM COATED ORAL ONCE A DAY
Refills: 0 | Status: ACTIVE | COMMUNITY
Start: 2019-01-07

## 2021-03-17 RX ORDER — LEVOTHYROXINE SODIUM 0.07 MG/1
TAKE 1 TABLET DAILY TABLET ORAL
Refills: 0 | Status: ACTIVE | COMMUNITY

## 2021-03-17 RX ORDER — FLUTICASONE PROPIONATE 50 UG/1
1 SPRAY IN EACH NOSTRIL SPRAY, METERED NASAL ONCE A DAY
Refills: 0 | Status: ACTIVE | COMMUNITY
Start: 2013-08-21

## 2021-03-17 RX ORDER — PANTOPRAZOLE SODIUM 40 MG
1 TABLET TABLET, DELAYED RELEASE (ENTERIC COATED) ORAL
Refills: 3 | Status: ACTIVE | COMMUNITY
Start: 2016-04-21

## 2021-03-17 RX ORDER — NORTRIPTYLINE HYDROCHLORIDE 75 MG/1
1 CAPSULE CAPSULE ORAL ONCE A DAY
Status: ACTIVE | COMMUNITY

## 2021-03-17 RX ORDER — MOMETASONE FUROATE AND FORMOTEROL FUMARATE DIHYDRATE 200; 5 UG/1; UG/1
2 PUFFS AEROSOL RESPIRATORY (INHALATION) TWICE A DAY
Status: ACTIVE | COMMUNITY

## 2021-03-17 RX ORDER — FAMOTIDINE 40 MG/1
1 1/2 TABLETS TABLET ORAL ONCE A DAY
Refills: 3 | Status: ACTIVE | COMMUNITY
Start: 2016-06-07

## 2021-03-17 RX ORDER — MELOXICAM 15 MG/1
1 TABLET TABLET ORAL ONCE A DAY
Status: ACTIVE | COMMUNITY

## 2021-03-17 RX ORDER — SUCRALFATE 1 G
1 TABLET ON AN EMPTY STOMACH TABLET ORAL TWICE A DAY
Status: ON HOLD | COMMUNITY

## 2021-03-17 RX ORDER — OLANZAPINE 5 MG/1
TAKE 1 TABLET 1 TIMES DAILY TABLET ORAL
Refills: 0 | Status: ACTIVE | COMMUNITY

## 2021-03-17 RX ORDER — PREDNISONE 10 MG/1
1 TABLET PRN TABLET ORAL ONCE A DAY
Refills: 11 | Status: ACTIVE | COMMUNITY

## 2021-03-17 RX ORDER — BACLOFEN 10 MG/1
TAKE 1 TABLET 3 TIMES DAILY TABLET ORAL
Refills: 0 | Status: ON HOLD | COMMUNITY
Start: 2018-10-01

## 2021-03-17 RX ORDER — OXYCODONE HYDROCHLORIDE 10 MG/1
TAKE 1 TABLET EVERY 4 HOURS PRN TABLET ORAL
Refills: 0 | Status: ACTIVE | COMMUNITY
Start: 2018-10-31

## 2021-03-17 RX ORDER — FLUTICASONE FUROATE, UMECLIDINIUM BROMIDE AND VILANTEROL TRIFENATATE 100; 62.5; 25 UG/1; UG/1; UG/1
1 PUFF POWDER RESPIRATORY (INHALATION) ONCE A DAY
Refills: 0 | Status: ACTIVE | COMMUNITY

## 2021-03-17 RX ORDER — FUROSEMIDE 40 MG/1
1 TABLET TABLET ORAL ONCE A DAY
Refills: 0 | Status: ACTIVE | COMMUNITY
Start: 2012-06-12

## 2021-03-17 RX ORDER — NORTRIPTYLINE HYDROCHLORIDE 25 MG/1
1 CAPSULE CAPSULE ORAL ONCE A DAY
Status: ACTIVE | COMMUNITY

## 2021-06-03 ENCOUNTER — OFFICE VISIT (OUTPATIENT)
Dept: URBAN - METROPOLITAN AREA CLINIC 113 | Facility: CLINIC | Age: 61
End: 2021-06-03

## 2021-06-03 RX ORDER — NORTRIPTYLINE HYDROCHLORIDE 75 MG/1
1 CAPSULE CAPSULE ORAL ONCE A DAY
Status: ACTIVE | COMMUNITY

## 2021-06-03 RX ORDER — MOMETASONE FUROATE AND FORMOTEROL FUMARATE DIHYDRATE 200; 5 UG/1; UG/1
2 PUFFS AEROSOL RESPIRATORY (INHALATION) TWICE A DAY
Status: ACTIVE | COMMUNITY

## 2021-06-03 RX ORDER — FLUTICASONE PROPIONATE 50 UG/1
1 SPRAY IN EACH NOSTRIL SPRAY, METERED NASAL ONCE A DAY
Refills: 0 | Status: ACTIVE | COMMUNITY
Start: 2013-08-21

## 2021-06-03 RX ORDER — BACLOFEN 10 MG/1
TAKE 1 TABLET 3 TIMES DAILY TABLET ORAL
Refills: 0 | Status: ON HOLD | COMMUNITY
Start: 2018-10-01

## 2021-06-03 RX ORDER — MONTELUKAST SODIUM 10 MG/1
TAKE 1 TABLET DAILY TABLET, FILM COATED ORAL
Refills: 0 | Status: ACTIVE | COMMUNITY
Start: 2018-10-31

## 2021-06-03 RX ORDER — LEVOTHYROXINE SODIUM 0.07 MG/1
TAKE 1 TABLET DAILY TABLET ORAL
Refills: 0 | Status: ACTIVE | COMMUNITY

## 2021-06-03 RX ORDER — ALBUTEROL SULFATE 90 UG/1
INHALE 2 PUFFS 3 TIMES DAILY AS NEEDED AEROSOL, METERED RESPIRATORY (INHALATION)
Refills: 0 | Status: ACTIVE | COMMUNITY

## 2021-06-03 RX ORDER — PANTOPRAZOLE SODIUM 40 MG
1 TABLET TABLET, DELAYED RELEASE (ENTERIC COATED) ORAL
Refills: 3 | Status: ACTIVE | COMMUNITY
Start: 2016-04-21

## 2021-06-03 RX ORDER — OLANZAPINE 15 MG/1
1 TABLET TABLET, FILM COATED ORAL ONCE A DAY
Refills: 0 | Status: ACTIVE | COMMUNITY
Start: 2019-01-07

## 2021-06-03 RX ORDER — OXYCODONE HYDROCHLORIDE 10 MG/1
TAKE 1 TABLET EVERY 4 HOURS PRN TABLET ORAL
Refills: 0 | Status: ACTIVE | COMMUNITY
Start: 2018-10-31

## 2021-06-03 RX ORDER — SUCRALFATE 1 G/1
TAKE 1 TABLET EVERY 6 HOURS AS NEEDED FOR ABDOMINAL PAIN TABLET ORAL
Qty: 60 | Refills: 2 | Status: ACTIVE | COMMUNITY
End: 2021-06-13

## 2021-06-03 RX ORDER — OLANZAPINE 15 MG/1
1 TABLET TABLET ORAL ONCE A DAY
Status: ACTIVE | COMMUNITY

## 2021-06-03 RX ORDER — LINACLOTIDE 290 UG/1
TAKE 1 CAPSULE DAILY CAPSULE, GELATIN COATED ORAL ONCE A DAY
Qty: 90 | Refills: 1 | Status: ACTIVE | COMMUNITY
End: 2021-09-11

## 2021-06-03 RX ORDER — GABAPENTIN 300 MG/1
TAKE 1 CAPSULE 3 TIMES DAILY CAPSULE ORAL
Refills: 0 | Status: ACTIVE | COMMUNITY

## 2021-06-03 RX ORDER — TIOTROPIUM BROMIDE AND OLODATEROL 3.124; 2.736 UG/1; UG/1
2 PUFFS SPRAY, METERED RESPIRATORY (INHALATION) ONCE A DAY
Refills: 0 | Status: ON HOLD | COMMUNITY
Start: 2018-03-30

## 2021-06-03 RX ORDER — FLUTICASONE FUROATE, UMECLIDINIUM BROMIDE AND VILANTEROL TRIFENATATE 100; 62.5; 25 UG/1; UG/1; UG/1
1 PUFF POWDER RESPIRATORY (INHALATION) ONCE A DAY
Refills: 0 | Status: ACTIVE | COMMUNITY

## 2021-06-03 RX ORDER — DICYCLOMINE HYDROCHLORIDE 20 MG/1
1 TABLET TABLET ORAL THREE TIMES A DAY
Status: ACTIVE | COMMUNITY

## 2021-06-03 RX ORDER — NORTRIPTYLINE HYDROCHLORIDE 25 MG/1
1 CAPSULE CAPSULE ORAL ONCE A DAY
Status: ACTIVE | COMMUNITY

## 2021-06-03 RX ORDER — MELOXICAM 15 MG/1
1 TABLET TABLET ORAL ONCE A DAY
Status: ACTIVE | COMMUNITY

## 2021-06-03 RX ORDER — PREDNISONE 10 MG/1
1 TABLET PRN TABLET ORAL ONCE A DAY
Refills: 11 | Status: ACTIVE | COMMUNITY

## 2021-06-03 RX ORDER — SUCRALFATE 1 G
1 TABLET ON AN EMPTY STOMACH TABLET ORAL TWICE A DAY
Status: ON HOLD | COMMUNITY

## 2021-06-03 RX ORDER — OLANZAPINE 5 MG/1
TAKE 1 TABLET 1 TIMES DAILY TABLET ORAL
Refills: 0 | Status: ACTIVE | COMMUNITY

## 2021-06-03 RX ORDER — FAMOTIDINE 40 MG/1
1 1/2 TABLETS TABLET ORAL ONCE A DAY
Refills: 3 | Status: ACTIVE | COMMUNITY
Start: 2016-06-07

## 2021-06-03 RX ORDER — FUROSEMIDE 40 MG/1
1 TABLET TABLET ORAL ONCE A DAY
Refills: 0 | Status: ACTIVE | COMMUNITY
Start: 2012-06-12

## 2021-06-09 ENCOUNTER — ERX REFILL RESPONSE (OUTPATIENT)
Dept: URBAN - METROPOLITAN AREA CLINIC 113 | Facility: CLINIC | Age: 61
End: 2021-06-09

## 2021-06-09 RX ORDER — ALBUTEROL SULFATE 90 UG/1
USE 2 PUFFS EVERY 4 HOURS AS NEEDED AEROSOL, METERED RESPIRATORY (INHALATION)
Qty: 2 | Refills: 1

## 2021-07-19 ENCOUNTER — ERX REFILL RESPONSE (OUTPATIENT)
Dept: URBAN - METROPOLITAN AREA CLINIC 113 | Facility: CLINIC | Age: 61
End: 2021-07-19

## 2021-07-19 RX ORDER — ALBUTEROL SULFATE 90 UG/1
USE 2 PUFFS EVERY 4 HOURS AS NEEDED AEROSOL, METERED RESPIRATORY (INHALATION)
Qty: 18 GRAM | Refills: 2 | OUTPATIENT

## 2021-07-19 RX ORDER — LINACLOTIDE 290 UG/1
TAKE ONE CAPSULE DAILY CAPSULE, GELATIN COATED ORAL
Qty: 90 CAPSULE | Refills: 2 | OUTPATIENT

## 2021-07-19 RX ORDER — ALBUTEROL SULFATE 90 UG/1
USE 2 PUFFS EVERY 4 HOURS AS NEEDED AEROSOL, METERED RESPIRATORY (INHALATION)
Qty: 2 | Refills: 1 | OUTPATIENT

## 2021-07-19 RX ORDER — LINACLOTIDE 290 UG/1
TAKE 1 CAPSULE DAILY CAPSULE, GELATIN COATED ORAL ONCE A DAY
Qty: 90 | Refills: 1 | OUTPATIENT

## 2021-07-22 ENCOUNTER — OFFICE VISIT (OUTPATIENT)
Dept: URBAN - METROPOLITAN AREA CLINIC 113 | Facility: CLINIC | Age: 61
End: 2021-07-22

## 2021-11-23 ENCOUNTER — OFFICE VISIT (OUTPATIENT)
Dept: URBAN - METROPOLITAN AREA CLINIC 113 | Facility: CLINIC | Age: 61
End: 2021-11-23

## 2022-02-07 ENCOUNTER — OFFICE VISIT (OUTPATIENT)
Dept: URBAN - METROPOLITAN AREA CLINIC 113 | Facility: CLINIC | Age: 62
End: 2022-02-07

## 2022-02-15 ENCOUNTER — OFFICE VISIT (OUTPATIENT)
Dept: URBAN - METROPOLITAN AREA CLINIC 113 | Facility: CLINIC | Age: 62
End: 2022-02-15

## 2022-02-25 ENCOUNTER — OFFICE VISIT (OUTPATIENT)
Dept: URBAN - METROPOLITAN AREA CLINIC 113 | Facility: CLINIC | Age: 62
End: 2022-02-25
Payer: COMMERCIAL

## 2022-02-25 ENCOUNTER — LAB OUTSIDE AN ENCOUNTER (OUTPATIENT)
Dept: URBAN - METROPOLITAN AREA CLINIC 113 | Facility: CLINIC | Age: 62
End: 2022-02-25

## 2022-02-25 ENCOUNTER — WEB ENCOUNTER (OUTPATIENT)
Dept: URBAN - METROPOLITAN AREA CLINIC 113 | Facility: CLINIC | Age: 62
End: 2022-02-25

## 2022-02-25 VITALS
HEIGHT: 68 IN | WEIGHT: 223 LBS | SYSTOLIC BLOOD PRESSURE: 123 MMHG | HEART RATE: 72 BPM | BODY MASS INDEX: 33.8 KG/M2 | DIASTOLIC BLOOD PRESSURE: 79 MMHG | TEMPERATURE: 98.2 F

## 2022-02-25 DIAGNOSIS — K20.80 ESOPHAGITIS, LOS ANGELES GRADE B: ICD-10-CM

## 2022-02-25 DIAGNOSIS — K22.2 ESOPHAGEAL STRICTURE: ICD-10-CM

## 2022-02-25 DIAGNOSIS — R13.14 PHARYNGOESOPHAGEAL DYSPHAGIA: ICD-10-CM

## 2022-02-25 DIAGNOSIS — K59.00 CONSTIPATION: ICD-10-CM

## 2022-02-25 DIAGNOSIS — K21.9 GERD: ICD-10-CM

## 2022-02-25 PROCEDURE — 99214 OFFICE O/P EST MOD 30 MIN: CPT | Performed by: NURSE PRACTITIONER

## 2022-02-25 RX ORDER — FLUTICASONE FUROATE, UMECLIDINIUM BROMIDE AND VILANTEROL TRIFENATATE 100; 62.5; 25 UG/1; UG/1; UG/1
1 PUFF POWDER RESPIRATORY (INHALATION) ONCE A DAY
Refills: 0 | Status: ACTIVE | COMMUNITY

## 2022-02-25 RX ORDER — MOMETASONE FUROATE AND FORMOTEROL FUMARATE DIHYDRATE 200; 5 UG/1; UG/1
2 PUFFS AEROSOL RESPIRATORY (INHALATION) TWICE A DAY
Status: ACTIVE | COMMUNITY

## 2022-02-25 RX ORDER — FUROSEMIDE 80 MG/1
1 TABLET TABLET ORAL ONCE A DAY
Status: ACTIVE | COMMUNITY

## 2022-02-25 RX ORDER — MELOXICAM 15 MG/1
1 TABLET TABLET ORAL ONCE A DAY
Status: ACTIVE | COMMUNITY

## 2022-02-25 RX ORDER — CLONIDINE HYDROCHLORIDE 0.1 MG/1
1 TABLET TABLET ORAL
Status: ACTIVE | COMMUNITY

## 2022-02-25 RX ORDER — MONTELUKAST SODIUM 10 MG/1
TAKE 1 TABLET DAILY TABLET, FILM COATED ORAL
Refills: 0 | Status: ACTIVE | COMMUNITY
Start: 2018-10-31

## 2022-02-25 RX ORDER — OLANZAPINE 5 MG/1
TAKE 1 TABLET 1 TIMES DAILY TABLET ORAL
Refills: 0 | Status: ACTIVE | COMMUNITY

## 2022-02-25 RX ORDER — FAMOTIDINE 40 MG/1
1 TABLET AT BEDTIME TABLET, FILM COATED ORAL ONCE A DAY
Qty: 90 TABLETS | Refills: 3 | OUTPATIENT
Start: 2022-02-25

## 2022-02-25 RX ORDER — LINACLOTIDE 290 UG/1
1 CAPSULE AT LEAST 30 MINUTES BEFORE THE FIRST MEAL OF THE DAY ON AN EMPTY STOMACH CAPSULE, GELATIN COATED ORAL ONCE A DAY
Qty: 90 | Refills: 3 | OUTPATIENT
Start: 2022-02-25 | End: 2023-02-20

## 2022-02-25 RX ORDER — OLANZAPINE 15 MG/1
1 TABLET TABLET ORAL ONCE A DAY
Status: ACTIVE | COMMUNITY

## 2022-02-25 RX ORDER — SERTRALINE HYDROCHLORIDE 25 MG/1
1 TABLET TABLET, FILM COATED ORAL ONCE A DAY
Status: ACTIVE | COMMUNITY

## 2022-02-25 RX ORDER — CLONIDINE HYDROCHLORIDE 0.3 MG/1
1 TABLET TABLET ORAL ONCE A DAY
Status: ACTIVE | COMMUNITY

## 2022-02-25 RX ORDER — GABAPENTIN 300 MG/1
2 CAPSULE CAPSULE ORAL
Status: ACTIVE | COMMUNITY

## 2022-02-25 RX ORDER — GABAPENTIN 300 MG/1
1 CAPSULE CAPSULE ORAL ONCE A DAY
Status: ACTIVE | COMMUNITY

## 2022-02-25 RX ORDER — PANTOPRAZOLE SODIUM 40 MG/1
1 TABLET TABLET, DELAYED RELEASE ORAL TWICE DAILY
Qty: 180 TABLET | Refills: 3 | OUTPATIENT
Start: 2022-02-25

## 2022-02-25 RX ORDER — POTASSIUM CHLORIDE 8 MEQ/1
2 TABLETS WITH FOOD TABLET, FILM COATED, EXTENDED RELEASE ORAL TWICE A DAY
Status: ACTIVE | COMMUNITY

## 2022-02-25 RX ORDER — LEVOTHYROXINE SODIUM 0.07 MG/1
TAKE 1 TABLET DAILY TABLET ORAL
Refills: 0 | Status: ACTIVE | COMMUNITY

## 2022-02-25 RX ORDER — OXYCODONE HYDROCHLORIDE 10 MG/1
TAKE 1 TABLET EVERY 4 HOURS PRN TABLET ORAL
Refills: 0 | Status: ACTIVE | COMMUNITY
Start: 2018-10-31

## 2022-02-25 NOTE — HPI-TODAY'S VISIT:
61 year old female with a history of brain aneurysm x 2 with repair in 2015, adrenal insufficiency, thyroid nodules, GERD, Blanca's esophagus, adenomatous colon polyps, chronic constipation, gastroparesis, DM, COPD, PTSD, bipolar disorder, and panic attacks with agoraphobia, presenting for follow up.  She was seen in the office in March 2021 for evaluation of recurrent dysphagia, previously responsive to esophageal dilation. Repeat EGD with dilation was planned. She was to continue pantoprazole and famotidine for GERD. Her constipation was managed with Linzess 290mcg daily.  EGD 3/17/21: LA grade B reflux esophagitis limited to the GEJ, mild benign-appearing distal esophageal stricture at the GE junction s/p Savary dilation to 48F, moderate hiatal hernia, two small 3mm David's erosions at the diaphragmatic rim, mild nonerosive antral gastritis. Gastric biopsies showed mild reactive chemical gastropathy, negative for H. pylori. She was instructed to increase pantoprazole 40mg bid and continue nightly famotidine.   Her trouble swallowing has recurred. She indicates solid food will become hung at the level of the sternal notch. She is unable to catch her breath or swallow her saliva. At times, the food will pass with time, however, on occasion this will result in belching with regurgitation. This has been ongoing for 4 months. She has been off of her pantoprazole and famotidine for at least the last 3 months. She has experienced increasing heartburn since stopping the medication.  She complains of worsening constipation despite Linzess 290mcg daily. She will go 5-10 days without a bowel movement. She complains of the urge to defecate, just passing gas or a small pebble of stool. The addition of 3 Dulcolax is helpful. She complains of diffuse abdominal pain, which is worsened in the lower abdomen. She did have some nausea with vomiting earlier in the week. Her interval history is notable for a hip replacement in March of 2021, complicated post-operatively by NAYELI lopez

## 2022-02-25 NOTE — HPI-OTHER HISTORIES
CT of the abdomen and pelvis with contrast on 6/26/20: left adrenal adenoma, diverticulosis. Labs 6/25/20: CBC, CMP unremarkable. Lipase 15. TSH 0.73. Upper GI series was performed on 1/9/19 and revealed moderate gastroesophageal reflux. EGD 10/3/18: LA grade A reflux esophagitis at 39 cm status post biopsies revealing squamocolumnar mucosa with mild chronic inflammation negative for intestinal metaplasia or dysplasia. Small hiatal hernia. Status post empiric savory dilation to 48 Bengali. EGD otherwise normal to D2. Colonoscopy (3/23/17): BBPS score 9, a 3 mm ascending colon polyp, a 3 mm rectosigmoid colon polyp, significant looping of colon, moderate internal hemorrhoids.

## 2022-02-28 ENCOUNTER — TELEPHONE ENCOUNTER (OUTPATIENT)
Dept: URBAN - METROPOLITAN AREA CLINIC 113 | Facility: CLINIC | Age: 62
End: 2022-02-28

## 2022-03-17 ENCOUNTER — OFFICE VISIT (OUTPATIENT)
Dept: URBAN - METROPOLITAN AREA MEDICAL CENTER 19 | Facility: MEDICAL CENTER | Age: 62
End: 2022-03-17
Payer: COMMERCIAL

## 2022-03-17 DIAGNOSIS — K20.80 OTHER ESOPHAGITIS WITHOUT BLEEDING: ICD-10-CM

## 2022-03-17 DIAGNOSIS — K21.9 ACID REFLUX RESOLVED ON PPI: ICD-10-CM

## 2022-03-17 DIAGNOSIS — R13.19: ICD-10-CM

## 2022-03-17 DIAGNOSIS — K22.89 ESOPHAGEAL MASS: ICD-10-CM

## 2022-03-17 DIAGNOSIS — K44.9 DIAPHRAGMATIC HERNIA: ICD-10-CM

## 2022-03-17 PROCEDURE — 43248 EGD GUIDE WIRE INSERTION: CPT | Performed by: INTERNAL MEDICINE

## 2022-03-17 PROCEDURE — 43239 EGD BIOPSY SINGLE/MULTIPLE: CPT | Performed by: INTERNAL MEDICINE

## 2022-03-17 RX ORDER — POTASSIUM CHLORIDE 8 MEQ/1
2 TABLETS WITH FOOD TABLET, FILM COATED, EXTENDED RELEASE ORAL TWICE A DAY
Status: ACTIVE | COMMUNITY

## 2022-03-17 RX ORDER — LEVOTHYROXINE SODIUM 0.07 MG/1
TAKE 1 TABLET DAILY TABLET ORAL
Refills: 0 | Status: ACTIVE | COMMUNITY

## 2022-03-17 RX ORDER — PANTOPRAZOLE SODIUM 40 MG/1
1 TABLET TABLET, DELAYED RELEASE ORAL TWICE DAILY
Qty: 180 TABLET | Refills: 3 | Status: ACTIVE | COMMUNITY
Start: 2022-02-25

## 2022-03-17 RX ORDER — SERTRALINE HYDROCHLORIDE 25 MG/1
1 TABLET TABLET, FILM COATED ORAL ONCE A DAY
Status: ACTIVE | COMMUNITY

## 2022-03-17 RX ORDER — OLANZAPINE 15 MG/1
1 TABLET TABLET ORAL ONCE A DAY
Status: ACTIVE | COMMUNITY

## 2022-03-17 RX ORDER — OXYCODONE HYDROCHLORIDE 10 MG/1
TAKE 1 TABLET EVERY 4 HOURS PRN TABLET ORAL
Refills: 0 | Status: ACTIVE | COMMUNITY
Start: 2018-10-31

## 2022-03-17 RX ORDER — CLONIDINE HYDROCHLORIDE 0.1 MG/1
1 TABLET TABLET ORAL
Status: ACTIVE | COMMUNITY

## 2022-03-17 RX ORDER — FAMOTIDINE 40 MG/1
1 TABLET AT BEDTIME TABLET, FILM COATED ORAL ONCE A DAY
Qty: 90 TABLETS | Refills: 3 | Status: ACTIVE | COMMUNITY
Start: 2022-02-25

## 2022-03-17 RX ORDER — LINACLOTIDE 290 UG/1
1 CAPSULE AT LEAST 30 MINUTES BEFORE THE FIRST MEAL OF THE DAY ON AN EMPTY STOMACH CAPSULE, GELATIN COATED ORAL ONCE A DAY
Qty: 90 | Refills: 3 | Status: ACTIVE | COMMUNITY
Start: 2022-02-25 | End: 2023-02-20

## 2022-03-17 RX ORDER — CLONIDINE HYDROCHLORIDE 0.3 MG/1
1 TABLET TABLET ORAL ONCE A DAY
Status: ACTIVE | COMMUNITY

## 2022-03-17 RX ORDER — FUROSEMIDE 80 MG/1
1 TABLET TABLET ORAL ONCE A DAY
Status: ACTIVE | COMMUNITY

## 2022-03-17 RX ORDER — MELOXICAM 15 MG/1
1 TABLET TABLET ORAL ONCE A DAY
Status: ACTIVE | COMMUNITY

## 2022-03-17 RX ORDER — GABAPENTIN 300 MG/1
1 CAPSULE CAPSULE ORAL ONCE A DAY
Status: ACTIVE | COMMUNITY

## 2022-03-17 RX ORDER — MOMETASONE FUROATE AND FORMOTEROL FUMARATE DIHYDRATE 200; 5 UG/1; UG/1
2 PUFFS AEROSOL RESPIRATORY (INHALATION) TWICE A DAY
Status: ACTIVE | COMMUNITY

## 2022-03-17 RX ORDER — FLUTICASONE FUROATE, UMECLIDINIUM BROMIDE AND VILANTEROL TRIFENATATE 100; 62.5; 25 UG/1; UG/1; UG/1
1 PUFF POWDER RESPIRATORY (INHALATION) ONCE A DAY
Refills: 0 | Status: ACTIVE | COMMUNITY

## 2022-03-17 RX ORDER — OLANZAPINE 5 MG/1
TAKE 1 TABLET 1 TIMES DAILY TABLET ORAL
Refills: 0 | Status: ACTIVE | COMMUNITY

## 2022-03-17 RX ORDER — GABAPENTIN 300 MG/1
2 CAPSULE CAPSULE ORAL
Status: ACTIVE | COMMUNITY

## 2022-03-17 RX ORDER — MONTELUKAST SODIUM 10 MG/1
TAKE 1 TABLET DAILY TABLET, FILM COATED ORAL
Refills: 0 | Status: ACTIVE | COMMUNITY
Start: 2018-10-31

## 2022-06-16 ENCOUNTER — OFFICE VISIT (OUTPATIENT)
Dept: URBAN - METROPOLITAN AREA CLINIC 113 | Facility: CLINIC | Age: 62
End: 2022-06-16
Payer: COMMERCIAL

## 2022-06-16 VITALS
SYSTOLIC BLOOD PRESSURE: 110 MMHG | BODY MASS INDEX: 32.89 KG/M2 | WEIGHT: 217 LBS | TEMPERATURE: 97.8 F | DIASTOLIC BLOOD PRESSURE: 74 MMHG | RESPIRATION RATE: 18 BRPM | HEART RATE: 72 BPM | HEIGHT: 68 IN

## 2022-06-16 DIAGNOSIS — K59.00 CONSTIPATION: ICD-10-CM

## 2022-06-16 DIAGNOSIS — R13.14 PHARYNGOESOPHAGEAL DYSPHAGIA: ICD-10-CM

## 2022-06-16 DIAGNOSIS — K20.80 ESOPHAGITIS, LOS ANGELES GRADE B: ICD-10-CM

## 2022-06-16 DIAGNOSIS — K21.9 GERD: ICD-10-CM

## 2022-06-16 DIAGNOSIS — K22.2 ESOPHAGEAL STRICTURE: ICD-10-CM

## 2022-06-16 PROBLEM — 40739000 DYSPHAGIA: Status: ACTIVE | Noted: 2021-03-02

## 2022-06-16 PROBLEM — 235595009 GASTROESOPHAGEAL REFLUX DISEASE: Status: ACTIVE | Noted: 2021-03-02

## 2022-06-16 PROBLEM — 14760008 CONSTIPATION: Status: ACTIVE | Noted: 2022-06-16

## 2022-06-16 PROBLEM — 63305008 ESOPHAGEAL STRICTURE: Status: ACTIVE | Noted: 2022-02-25

## 2022-06-16 PROCEDURE — 99213 OFFICE O/P EST LOW 20 MIN: CPT | Performed by: INTERNAL MEDICINE

## 2022-06-16 RX ORDER — PANTOPRAZOLE SODIUM 40 MG/1
1 TABLET TABLET, DELAYED RELEASE ORAL TWICE DAILY
Qty: 180 TABLET | Refills: 3 | Status: ACTIVE | COMMUNITY
Start: 2022-02-25

## 2022-06-16 RX ORDER — GABAPENTIN 300 MG/1
1 CAPSULE CAPSULE ORAL ONCE A DAY
Status: ACTIVE | COMMUNITY

## 2022-06-16 RX ORDER — PANTOPRAZOLE SODIUM 40 MG/1
1 TABLET TABLET, DELAYED RELEASE ORAL TWICE DAILY
Qty: 180 TABLET | Refills: 3 | OUTPATIENT

## 2022-06-16 RX ORDER — OXYCODONE HYDROCHLORIDE 10 MG/1
TAKE 1 TABLET EVERY 4 HOURS PRN TABLET ORAL
Refills: 0 | Status: ACTIVE | COMMUNITY
Start: 2018-10-31

## 2022-06-16 RX ORDER — BENRALIZUMAB 30 MG/ML
1 INJECTION INJECTION, SOLUTION SUBCUTANEOUS
Status: ACTIVE | COMMUNITY

## 2022-06-16 RX ORDER — OLANZAPINE 15 MG/1
1 TABLET TABLET ORAL ONCE A DAY
Status: ACTIVE | COMMUNITY

## 2022-06-16 RX ORDER — MELOXICAM 15 MG/1
1 TABLET TABLET ORAL ONCE A DAY
Status: ACTIVE | COMMUNITY

## 2022-06-16 RX ORDER — FAMOTIDINE 40 MG/1
1 TABLET AT BEDTIME TABLET, FILM COATED ORAL ONCE A DAY
Qty: 90 TABLETS | Refills: 3 | OUTPATIENT

## 2022-06-16 RX ORDER — LINACLOTIDE 290 UG/1
1 CAPSULE AT LEAST 30 MINUTES BEFORE THE FIRST MEAL OF THE DAY ON AN EMPTY STOMACH CAPSULE, GELATIN COATED ORAL ONCE A DAY
Qty: 90 | Refills: 3 | OUTPATIENT

## 2022-06-16 RX ORDER — CLONIDINE HYDROCHLORIDE 0.1 MG/1
1 TABLET TABLET ORAL
Status: ACTIVE | COMMUNITY

## 2022-06-16 RX ORDER — FUROSEMIDE 80 MG/1
1 TABLET TABLET ORAL ONCE A DAY
Status: ACTIVE | COMMUNITY

## 2022-06-16 RX ORDER — LINACLOTIDE 290 UG/1
1 CAPSULE AT LEAST 30 MINUTES BEFORE THE FIRST MEAL OF THE DAY ON AN EMPTY STOMACH CAPSULE, GELATIN COATED ORAL ONCE A DAY
Qty: 90 | Refills: 3 | Status: ACTIVE | COMMUNITY
Start: 2022-02-25 | End: 2023-02-20

## 2022-06-16 RX ORDER — FAMOTIDINE 40 MG/1
1 TABLET AT BEDTIME TABLET, FILM COATED ORAL ONCE A DAY
Qty: 90 TABLETS | Refills: 3 | Status: ACTIVE | COMMUNITY
Start: 2022-02-25

## 2022-06-16 RX ORDER — OLANZAPINE 5 MG/1
TAKE 1 TABLET 1 TIMES DAILY TABLET ORAL
Refills: 0 | Status: ACTIVE | COMMUNITY

## 2022-06-16 RX ORDER — FLUTICASONE FUROATE, UMECLIDINIUM BROMIDE AND VILANTEROL TRIFENATATE 100; 62.5; 25 UG/1; UG/1; UG/1
1 PUFF POWDER RESPIRATORY (INHALATION) ONCE A DAY
Refills: 0 | Status: ACTIVE | COMMUNITY

## 2022-06-16 RX ORDER — CLONIDINE HYDROCHLORIDE 0.3 MG/1
1 TABLET TABLET ORAL ONCE A DAY
Status: ACTIVE | COMMUNITY

## 2022-06-16 RX ORDER — GABAPENTIN 300 MG/1
2 CAPSULE CAPSULE ORAL
Status: ACTIVE | COMMUNITY

## 2022-06-16 RX ORDER — POTASSIUM CHLORIDE 8 MEQ/1
2 TABLETS WITH FOOD TABLET, FILM COATED, EXTENDED RELEASE ORAL TWICE A DAY
Status: ACTIVE | COMMUNITY

## 2022-06-16 RX ORDER — MOMETASONE FUROATE AND FORMOTEROL FUMARATE DIHYDRATE 200; 5 UG/1; UG/1
2 PUFFS AEROSOL RESPIRATORY (INHALATION) TWICE A DAY
Status: ACTIVE | COMMUNITY

## 2022-06-16 RX ORDER — LEVOTHYROXINE SODIUM 0.07 MG/1
TAKE 1 TABLET DAILY TABLET ORAL
Refills: 0 | Status: ACTIVE | COMMUNITY

## 2022-06-16 RX ORDER — MONTELUKAST SODIUM 10 MG/1
TAKE 1 TABLET DAILY TABLET, FILM COATED ORAL
Refills: 0 | Status: ACTIVE | COMMUNITY
Start: 2018-10-31

## 2022-06-16 RX ORDER — SERTRALINE HYDROCHLORIDE 25 MG/1
1 TABLET TABLET, FILM COATED ORAL ONCE A DAY
Status: ACTIVE | COMMUNITY

## 2022-06-16 NOTE — HPI-OTHER HISTORIES
CT of the abdomen and pelvis with contrast on 6/26/20: left adrenal adenoma, diverticulosis. Labs 6/25/20: CBC, CMP unremarkable. Lipase 15. TSH 0.73. Upper GI series was performed on 1/9/19 and revealed moderate gastroesophageal reflux. EGD 10/3/18: LA grade A reflux esophagitis at 39 cm status post biopsies revealing squamocolumnar mucosa with mild chronic inflammation negative for intestinal metaplasia or dysplasia. Small hiatal hernia. Status post empiric savory dilation to 48 Estonian. EGD otherwise normal to D2. Colonoscopy (3/23/17): BBPS score 9, a 3 mm ascending colon polyp, a 3 mm rectosigmoid colon polyp, significant looping of colon, moderate internal hemorrhoids.

## 2022-06-16 NOTE — HPI-TODAY'S VISIT:
61 year old female with a history of brain aneurysm x 2 with repair in 2015, adrenal insufficiency, thyroid nodules, GERD, Blanca's esophagus, adenomatous colon polyps, chronic constipation, gastroparesis, DM, COPD, PTSD, bipolar disorder, and panic attacks with agoraphobia, presenting for follow up.  She was seen in the office in March 2021 for evaluation of recurrent dysphagia, previously responsive to esophageal dilation. Repeat EGD with dilation was planned. She was to continue pantoprazole and famotidine for GERD. Her constipation was managed with Linzess 290mcg daily.  EGD 3/17/21: LA grade B reflux esophagitis limited to the GEJ, mild benign-appearing distal esophageal stricture at the GE junction s/p Savary dilation to 48F, moderate hiatal hernia, two small 3mm David's erosions at the diaphragmatic rim, mild nonerosive antral gastritis. Gastric biopsies showed mild reactive chemical gastropathy, negative for H. pylori. She was instructed to increase pantoprazole 40mg bid and continue nightly famotidine.   Her trouble swallowing has recurred. She indicates solid food will become hung at the level of the sternal notch. She is unable to catch her breath or swallow her saliva. At times, the food will pass with time, however, on occasion this will result in belching with regurgitation. This has been ongoing for 4 months. She has been off of her pantoprazole and famotidine for at least the last 3 months. She has experienced increasing heartburn since stopping the medication.  She complains of worsening constipation despite Linzess 290mcg daily. She will go 5-10 days without a bowel movement. She complains of the urge to defecate, just passing gas or a small pebble of stool. The addition of 3 Dulcolax is helpful. She complains of diffuse abdominal pain, which is worsened in the lower abdomen. She did have some nausea with vomiting earlier in the week. Her interval history is notable for a hip replacement in March of 2021, complicated post-operatively by NAYELI davidson.  An EGD was performed on 3/17/22. There was a moderate hiatal hernia. No esophageal stricture was evident; she was dilated to 51F using Savary dilation. Regular Z line 39 cm from the incisors. Otherwise, normal EGD to D3.  She continues with trouble swallowing. No improvement following dilation. She tells me that she had trouble swallowing soup secondary to trouble with transferring the food from the mouth to her esophagus. She describes a sensation of food sticking in the lower esophagus. This can be painful. She has trouble with swallowing cold foods as well. She is also having trouble with nausea and dysgeusia. She is tolerating one, handful sized meal per day. She admits significant weight loss, but our scales are only notable for about a 7 pound weight loss in the last 4 months. She suggested that she may have lost closer to 50 pounds; further discuss reveals that this 50 pound weight loss has occurred over several years.  She continues with constipation predominant bowel habits, going as many as 5 days without a bowel movement. She is taking Linzess daily currently. This was helpful initially. She is now having to Linzess with Dulcolax. She has tried MOM, but feels this exacerbates her constipation. She stopped taking this.

## 2022-11-10 ENCOUNTER — OFFICE VISIT (OUTPATIENT)
Dept: URBAN - METROPOLITAN AREA CLINIC 113 | Facility: CLINIC | Age: 62
End: 2022-11-10

## 2023-01-26 ENCOUNTER — OFFICE VISIT (OUTPATIENT)
Dept: URBAN - METROPOLITAN AREA CLINIC 113 | Facility: CLINIC | Age: 63
End: 2023-01-26

## 2023-01-31 ENCOUNTER — ERX REFILL RESPONSE (OUTPATIENT)
Dept: URBAN - METROPOLITAN AREA CLINIC 113 | Facility: CLINIC | Age: 63
End: 2023-01-31

## 2023-01-31 RX ORDER — FAMOTIDINE 40 MG/1
1 TABLET AT BEDTIME TABLET, FILM COATED ORAL ONCE A DAY
Qty: 90 TABLETS | Refills: 3 | OUTPATIENT

## 2023-01-31 RX ORDER — PANTOPRAZOLE 40 MG/1
TAKE ONE TABLET BY MOUTH TWICE DAILY TABLET, DELAYED RELEASE ORAL
Qty: 180 TABLET | Refills: 2 | OUTPATIENT

## 2023-01-31 RX ORDER — FAMOTIDINE 40 MG/1
TAKE ONE TABLET BY MOUTH AT BEDTIME TABLET, FILM COATED ORAL
Qty: 90 TABLET | Refills: 2 | OUTPATIENT

## 2023-01-31 RX ORDER — PANTOPRAZOLE SODIUM 40 MG/1
1 TABLET TABLET, DELAYED RELEASE ORAL TWICE DAILY
Qty: 180 TABLET | Refills: 3 | OUTPATIENT

## 2023-02-28 ENCOUNTER — ERX REFILL RESPONSE (OUTPATIENT)
Dept: URBAN - METROPOLITAN AREA CLINIC 113 | Facility: CLINIC | Age: 63
End: 2023-02-28

## 2023-02-28 RX ORDER — LINACLOTIDE 290 UG/1
TAKE 1 CAPSULE BY MOUTH AT LEAST 30 MINUTES BEFORE THE FIRST MEAL OF THE DAY ON AN EMPTY STOMACH CAPSULE, GELATIN COATED ORAL
Qty: 90 CAPSULE | Refills: 3 | OUTPATIENT

## 2023-02-28 RX ORDER — LINACLOTIDE 290 UG/1
1 CAPSULE AT LEAST 30 MINUTES BEFORE THE FIRST MEAL OF THE DAY ON AN EMPTY STOMACH CAPSULE, GELATIN COATED ORAL ONCE A DAY
Qty: 90 | Refills: 3 | OUTPATIENT

## 2023-04-04 ENCOUNTER — OFFICE VISIT (OUTPATIENT)
Dept: URBAN - METROPOLITAN AREA CLINIC 113 | Facility: CLINIC | Age: 63
End: 2023-04-04
Payer: COMMERCIAL

## 2023-04-04 ENCOUNTER — LAB OUTSIDE AN ENCOUNTER (OUTPATIENT)
Dept: URBAN - METROPOLITAN AREA CLINIC 113 | Facility: CLINIC | Age: 63
End: 2023-04-04

## 2023-04-04 VITALS
HEIGHT: 68 IN | HEART RATE: 60 BPM | RESPIRATION RATE: 18 BRPM | WEIGHT: 214 LBS | BODY MASS INDEX: 32.43 KG/M2 | SYSTOLIC BLOOD PRESSURE: 121 MMHG | DIASTOLIC BLOOD PRESSURE: 75 MMHG | TEMPERATURE: 97.3 F

## 2023-04-04 DIAGNOSIS — K21.9 GERD: ICD-10-CM

## 2023-04-04 DIAGNOSIS — R13.14 PHARYNGOESOPHAGEAL DYSPHAGIA: ICD-10-CM

## 2023-04-04 DIAGNOSIS — K59.01 SLOW TRANSIT CONSTIPATION: ICD-10-CM

## 2023-04-04 DIAGNOSIS — K22.2 ESOPHAGEAL STRICTURE: ICD-10-CM

## 2023-04-04 PROCEDURE — 99214 OFFICE O/P EST MOD 30 MIN: CPT | Performed by: INTERNAL MEDICINE

## 2023-04-04 RX ORDER — FUROSEMIDE 80 MG/1
1 TABLET TABLET ORAL ONCE A DAY
Status: ACTIVE | COMMUNITY

## 2023-04-04 RX ORDER — FLUTICASONE FUROATE, UMECLIDINIUM BROMIDE AND VILANTEROL TRIFENATATE 100; 62.5; 25 UG/1; UG/1; UG/1
1 PUFF POWDER RESPIRATORY (INHALATION) ONCE A DAY
Refills: 0 | Status: ACTIVE | COMMUNITY

## 2023-04-04 RX ORDER — CLONIDINE HYDROCHLORIDE 0.3 MG/1
1 TABLET TABLET ORAL ONCE A DAY
Status: ACTIVE | COMMUNITY

## 2023-04-04 RX ORDER — SERTRALINE HYDROCHLORIDE 25 MG/1
1 TABLET TABLET, FILM COATED ORAL ONCE A DAY
Status: ACTIVE | COMMUNITY

## 2023-04-04 RX ORDER — BENRALIZUMAB 30 MG/ML
1 INJECTION INJECTION, SOLUTION SUBCUTANEOUS
Status: ACTIVE | COMMUNITY

## 2023-04-04 RX ORDER — GABAPENTIN 300 MG/1
2 CAPSULE CAPSULE ORAL
Status: ACTIVE | COMMUNITY

## 2023-04-04 RX ORDER — FAMOTIDINE 40 MG/1
TAKE ONE TABLET BY MOUTH AT BEDTIME TABLET, FILM COATED ORAL
Qty: 90 TABLET | Refills: 2 | Status: ACTIVE | COMMUNITY

## 2023-04-04 RX ORDER — OLANZAPINE 5 MG/1
TAKE 1 TABLET 1 TIMES DAILY TABLET ORAL
Refills: 0 | Status: ACTIVE | COMMUNITY

## 2023-04-04 RX ORDER — LEVOTHYROXINE SODIUM 0.07 MG/1
TAKE 1 TABLET DAILY TABLET ORAL
Refills: 0 | Status: ACTIVE | COMMUNITY

## 2023-04-04 RX ORDER — PANTOPRAZOLE 40 MG/1
TAKE ONE TABLET BY MOUTH TWICE DAILY TABLET, DELAYED RELEASE ORAL
Qty: 180 TABLET | Refills: 2 | Status: ACTIVE | COMMUNITY

## 2023-04-04 RX ORDER — CARVEDILOL 25 MG/1
1 TABLET WITH FOOD TABLET, FILM COATED ORAL TWICE A DAY
Status: ACTIVE | COMMUNITY

## 2023-04-04 RX ORDER — GABAPENTIN 300 MG/1
1 CAPSULE CAPSULE ORAL ONCE A DAY
Status: ACTIVE | COMMUNITY

## 2023-04-04 RX ORDER — MELOXICAM 15 MG/1
1 TABLET TABLET ORAL ONCE A DAY
Status: ACTIVE | COMMUNITY

## 2023-04-04 RX ORDER — LINACLOTIDE 290 UG/1
TAKE 1 CAPSULE BY MOUTH AT LEAST 30 MINUTES BEFORE THE FIRST MEAL OF THE DAY ON AN EMPTY STOMACH CAPSULE, GELATIN COATED ORAL
Qty: 90 CAPSULE | Refills: 3 | Status: ACTIVE | COMMUNITY

## 2023-04-04 RX ORDER — OXYCODONE HYDROCHLORIDE 10 MG/1
TAKE 1 TABLET EVERY 4 HOURS PRN TABLET ORAL
Refills: 0 | Status: ACTIVE | COMMUNITY
Start: 2018-10-31

## 2023-04-04 RX ORDER — MONTELUKAST SODIUM 10 MG/1
TAKE 1 TABLET DAILY TABLET, FILM COATED ORAL
Refills: 0 | Status: ACTIVE | COMMUNITY
Start: 2018-10-31

## 2023-04-04 RX ORDER — CLONIDINE HYDROCHLORIDE 0.1 MG/1
1 TABLET TABLET ORAL
Status: ACTIVE | COMMUNITY

## 2023-04-04 RX ORDER — MOMETASONE FUROATE AND FORMOTEROL FUMARATE DIHYDRATE 200; 5 UG/1; UG/1
2 PUFFS AEROSOL RESPIRATORY (INHALATION) TWICE A DAY
Status: ON HOLD | COMMUNITY

## 2023-04-04 RX ORDER — POTASSIUM CHLORIDE 8 MEQ/1
2 TABLETS WITH FOOD TABLET, FILM COATED, EXTENDED RELEASE ORAL TWICE A DAY
Status: ACTIVE | COMMUNITY

## 2023-04-04 RX ORDER — OLANZAPINE 15 MG/1
1 TABLET TABLET ORAL ONCE A DAY
Status: ACTIVE | COMMUNITY

## 2023-04-04 NOTE — HPI-TODAY'S VISIT:
62 year old female with a history of brain aneurysm x 2 with repair in 2015, adrenal insufficiency, thyroid nodules, GERD, Blanca's esophagus, adenomatous colon polyps, chronic constipation, gastroparesis, DM, COPD, PTSD, bipolar disorder, and panic attacks with agoraphobia, presenting for follow up. . From a reflux standpoint her symptoms are controlled on pantoprazole famotidine.  However she continues to have difficulty with solid greater than liquid dysphagia.  This is been a chronic problem.  She states that she does respond to dilations.  Last dilation was performed about 1 year ago.  Constipation is controlled with Linzess.  She does not have rectal bleeding or melena.  Weight is relatively stable.  She is still smoking and I talked her about this for a while today .

## 2023-04-04 NOTE — HPI-OTHER HISTORIES
EGD 3/17/22. There was a moderate hiatal hernia. No esophageal stricture was evident; she was dilated to 51F using Savary dilation. Regular Z line 39 cm from the incisors. Otherwise, normal EGD to D3. . EGD 3/17/21: LA grade B reflux esophagitis limited to the GEJ, mild benign-appearing distal esophageal stricture at the GE junction s/p Savary dilation to 48F, moderate hiatal hernia, two small 3mm David's erosions at the diaphragmatic rim, mild nonerosive antral gastritis. Gastric biopsies showed mild reactive chemical gastropathy, negative for H. pylori. She was instructed to increase pantoprazole 40mg bid and continue nightly famotidine.  . Her interval history is notable for a hip replacement in March of 2021, complicated post-operatively by C. dif. . CT of the abdomen and pelvis with contrast on 6/26/20: left adrenal adenoma, diverticulosis. . Labs 6/25/20: CBC, CMP unremarkable. Lipase 15. TSH 0.73. . Upper GI series was performed on 1/9/19 and revealed moderate gastroesophageal reflux. . EGD 10/3/18: LA grade A reflux esophagitis at 39 cm status post biopsies revealing squamocolumnar mucosa with mild chronic inflammation negative for intestinal metaplasia or dysplasia. Small hiatal hernia. Status post empiric savory dilation to 48 Panamanian. EGD otherwise normal to D2. . Colonoscopy (3/23/17): BBPS score 9, a 3 mm ascending colon polyp, a 3 mm rectosigmoid colon polyp, significant looping of colon, moderate internal hemorrhoids. . Esophageal manometry 1997.  Nonspecific esophageal motility disorder with spontaneous contractions and low amplitude contractions.

## 2023-04-19 ENCOUNTER — OFFICE VISIT (OUTPATIENT)
Dept: URBAN - METROPOLITAN AREA MEDICAL CENTER 19 | Facility: MEDICAL CENTER | Age: 63
End: 2023-04-19
Payer: COMMERCIAL

## 2023-04-19 DIAGNOSIS — K20.80 ESOPHAGITIS DISSECANS SUPERFICIALIS: ICD-10-CM

## 2023-04-19 DIAGNOSIS — K21.9 GERD: ICD-10-CM

## 2023-04-19 DIAGNOSIS — R13.14 PHARYNGOESOPHAGEAL DYSPHAGIA: ICD-10-CM

## 2023-04-19 DIAGNOSIS — K22.89 DILATATION OF ESOPHAGUS: ICD-10-CM

## 2023-04-19 DIAGNOSIS — K31.89 REACTIVE GASTROPATHY: ICD-10-CM

## 2023-04-19 PROCEDURE — 43239 EGD BIOPSY SINGLE/MULTIPLE: CPT | Performed by: INTERNAL MEDICINE

## 2023-04-19 PROCEDURE — 43248 EGD GUIDE WIRE INSERTION: CPT | Performed by: INTERNAL MEDICINE

## 2023-04-19 RX ORDER — GABAPENTIN 300 MG/1
2 CAPSULE CAPSULE ORAL
Status: ACTIVE | COMMUNITY

## 2023-04-19 RX ORDER — OLANZAPINE 15 MG/1
1 TABLET TABLET ORAL ONCE A DAY
Status: ACTIVE | COMMUNITY

## 2023-04-19 RX ORDER — FLUTICASONE FUROATE, UMECLIDINIUM BROMIDE AND VILANTEROL TRIFENATATE 100; 62.5; 25 UG/1; UG/1; UG/1
1 PUFF POWDER RESPIRATORY (INHALATION) ONCE A DAY
Refills: 0 | Status: ACTIVE | COMMUNITY

## 2023-04-19 RX ORDER — FAMOTIDINE 40 MG/1
TAKE ONE TABLET BY MOUTH AT BEDTIME TABLET, FILM COATED ORAL
Qty: 90 TABLET | Refills: 2 | Status: ACTIVE | COMMUNITY

## 2023-04-19 RX ORDER — PANTOPRAZOLE 40 MG/1
TAKE ONE TABLET BY MOUTH TWICE DAILY TABLET, DELAYED RELEASE ORAL
Qty: 180 TABLET | Refills: 2 | Status: ACTIVE | COMMUNITY

## 2023-04-19 RX ORDER — OXYCODONE HYDROCHLORIDE 10 MG/1
TAKE 1 TABLET EVERY 4 HOURS PRN TABLET ORAL
Refills: 0 | Status: ACTIVE | COMMUNITY
Start: 2018-10-31

## 2023-04-19 RX ORDER — LINACLOTIDE 290 UG/1
TAKE 1 CAPSULE BY MOUTH AT LEAST 30 MINUTES BEFORE THE FIRST MEAL OF THE DAY ON AN EMPTY STOMACH CAPSULE, GELATIN COATED ORAL
Qty: 90 CAPSULE | Refills: 3 | Status: ACTIVE | COMMUNITY

## 2023-04-19 RX ORDER — LEVOTHYROXINE SODIUM 0.07 MG/1
TAKE 1 TABLET DAILY TABLET ORAL
Refills: 0 | Status: ACTIVE | COMMUNITY

## 2023-04-19 RX ORDER — POTASSIUM CHLORIDE 8 MEQ/1
2 TABLETS WITH FOOD TABLET, FILM COATED, EXTENDED RELEASE ORAL TWICE A DAY
Status: ACTIVE | COMMUNITY

## 2023-04-19 RX ORDER — CLONIDINE HYDROCHLORIDE 0.3 MG/1
1 TABLET TABLET ORAL ONCE A DAY
Status: ACTIVE | COMMUNITY

## 2023-04-19 RX ORDER — MONTELUKAST SODIUM 10 MG/1
TAKE 1 TABLET DAILY TABLET, FILM COATED ORAL
Refills: 0 | Status: ACTIVE | COMMUNITY
Start: 2018-10-31

## 2023-04-19 RX ORDER — FUROSEMIDE 80 MG/1
1 TABLET TABLET ORAL ONCE A DAY
Status: ACTIVE | COMMUNITY

## 2023-04-19 RX ORDER — SERTRALINE HYDROCHLORIDE 25 MG/1
1 TABLET TABLET, FILM COATED ORAL ONCE A DAY
Status: ACTIVE | COMMUNITY

## 2023-04-19 RX ORDER — CARVEDILOL 25 MG/1
1 TABLET WITH FOOD TABLET, FILM COATED ORAL TWICE A DAY
Status: ACTIVE | COMMUNITY

## 2023-04-19 RX ORDER — MELOXICAM 15 MG/1
1 TABLET TABLET ORAL ONCE A DAY
Status: ACTIVE | COMMUNITY

## 2023-04-19 RX ORDER — BENRALIZUMAB 30 MG/ML
1 INJECTION INJECTION, SOLUTION SUBCUTANEOUS
Status: ACTIVE | COMMUNITY

## 2023-04-19 RX ORDER — GABAPENTIN 300 MG/1
1 CAPSULE CAPSULE ORAL ONCE A DAY
Status: ACTIVE | COMMUNITY

## 2023-04-19 RX ORDER — OLANZAPINE 5 MG/1
TAKE 1 TABLET 1 TIMES DAILY TABLET ORAL
Refills: 0 | Status: ACTIVE | COMMUNITY

## 2023-04-19 RX ORDER — MOMETASONE FUROATE AND FORMOTEROL FUMARATE DIHYDRATE 200; 5 UG/1; UG/1
2 PUFFS AEROSOL RESPIRATORY (INHALATION) TWICE A DAY
Status: ON HOLD | COMMUNITY

## 2023-04-19 RX ORDER — CLONIDINE HYDROCHLORIDE 0.1 MG/1
1 TABLET TABLET ORAL
Status: ACTIVE | COMMUNITY

## 2023-09-11 ENCOUNTER — ERX REFILL RESPONSE (OUTPATIENT)
Dept: URBAN - METROPOLITAN AREA CLINIC 113 | Facility: CLINIC | Age: 63
End: 2023-09-11

## 2023-09-11 RX ORDER — FAMOTIDINE 40 MG/1
TAKE ONE TABLET BY MOUTH AT BEDTIME TABLET, FILM COATED ORAL
Qty: 90 TABLET | Refills: 2 | OUTPATIENT

## 2023-09-11 RX ORDER — FAMOTIDINE 40 MG/1
TAKE ONE (1) TABLET BY MOUTH EVERY NIGHT AT BEDTIME AS DIRECTED TABLET, FILM COATED ORAL
Qty: 30 TABLET | Refills: 5 | OUTPATIENT

## 2023-10-26 ENCOUNTER — ERX REFILL RESPONSE (OUTPATIENT)
Dept: URBAN - METROPOLITAN AREA CLINIC 113 | Facility: CLINIC | Age: 63
End: 2023-10-26

## 2023-10-26 RX ORDER — PANTOPRAZOLE 40 MG/1
TAKE ONE TABLET BY MOUTH TWICE DAILY TABLET, DELAYED RELEASE ORAL
Qty: 180 TABLET | Refills: 2 | OUTPATIENT

## 2024-01-10 ENCOUNTER — OFFICE VISIT (OUTPATIENT)
Dept: URBAN - METROPOLITAN AREA CLINIC 107 | Facility: CLINIC | Age: 64
End: 2024-01-10

## 2024-01-10 RX ORDER — FLUTICASONE FUROATE, UMECLIDINIUM BROMIDE AND VILANTEROL TRIFENATATE 100; 62.5; 25 UG/1; UG/1; UG/1
1 PUFF POWDER RESPIRATORY (INHALATION) ONCE A DAY
Refills: 0 | Status: ACTIVE | COMMUNITY

## 2024-01-10 RX ORDER — POTASSIUM CHLORIDE 8 MEQ/1
2 TABLETS WITH FOOD TABLET, FILM COATED, EXTENDED RELEASE ORAL TWICE A DAY
Status: ACTIVE | COMMUNITY

## 2024-01-10 RX ORDER — FAMOTIDINE 40 MG/1
TAKE ONE (1) TABLET BY MOUTH EVERY NIGHT AT BEDTIME AS DIRECTED TABLET, FILM COATED ORAL
Qty: 30 TABLET | Refills: 5 | Status: ACTIVE | COMMUNITY

## 2024-01-10 RX ORDER — CARVEDILOL 25 MG/1
1 TABLET WITH FOOD TABLET, FILM COATED ORAL TWICE A DAY
Status: ACTIVE | COMMUNITY

## 2024-01-10 RX ORDER — BENRALIZUMAB 30 MG/ML
1 INJECTION INJECTION, SOLUTION SUBCUTANEOUS
Status: ACTIVE | COMMUNITY

## 2024-01-10 RX ORDER — SERTRALINE HYDROCHLORIDE 25 MG/1
1 TABLET TABLET, FILM COATED ORAL ONCE A DAY
Status: ACTIVE | COMMUNITY

## 2024-01-10 RX ORDER — LINACLOTIDE 290 UG/1
TAKE 1 CAPSULE BY MOUTH AT LEAST 30 MINUTES BEFORE THE FIRST MEAL OF THE DAY ON AN EMPTY STOMACH CAPSULE, GELATIN COATED ORAL
Qty: 90 CAPSULE | Refills: 3 | Status: ACTIVE | COMMUNITY

## 2024-01-10 RX ORDER — GABAPENTIN 300 MG/1
2 CAPSULE CAPSULE ORAL
Status: ACTIVE | COMMUNITY

## 2024-01-10 RX ORDER — MOMETASONE FUROATE AND FORMOTEROL FUMARATE DIHYDRATE 200; 5 UG/1; UG/1
2 PUFFS AEROSOL RESPIRATORY (INHALATION) TWICE A DAY
Status: ON HOLD | COMMUNITY

## 2024-01-10 RX ORDER — OLANZAPINE 5 MG/1
TAKE 1 TABLET 1 TIMES DAILY TABLET ORAL
Refills: 0 | Status: ACTIVE | COMMUNITY

## 2024-01-10 RX ORDER — LEVOTHYROXINE SODIUM 0.07 MG/1
TAKE 1 TABLET DAILY TABLET ORAL
Refills: 0 | Status: ACTIVE | COMMUNITY

## 2024-01-10 RX ORDER — CLONIDINE HYDROCHLORIDE 0.1 MG/1
1 TABLET TABLET ORAL
Status: ACTIVE | COMMUNITY

## 2024-01-10 RX ORDER — CLONIDINE HYDROCHLORIDE 0.3 MG/1
1 TABLET TABLET ORAL ONCE A DAY
Status: ACTIVE | COMMUNITY

## 2024-01-10 RX ORDER — GABAPENTIN 300 MG/1
1 CAPSULE CAPSULE ORAL ONCE A DAY
Status: ACTIVE | COMMUNITY

## 2024-01-10 RX ORDER — PANTOPRAZOLE 40 MG/1
TAKE ONE TABLET BY MOUTH TWICE DAILY TABLET, DELAYED RELEASE ORAL
Qty: 180 TABLET | Refills: 2 | Status: ACTIVE | COMMUNITY

## 2024-01-10 RX ORDER — FUROSEMIDE 80 MG/1
1 TABLET TABLET ORAL ONCE A DAY
Status: ACTIVE | COMMUNITY

## 2024-01-10 RX ORDER — OXYCODONE HYDROCHLORIDE 10 MG/1
TAKE 1 TABLET EVERY 4 HOURS PRN TABLET ORAL
Refills: 0 | Status: ACTIVE | COMMUNITY
Start: 2018-10-31

## 2024-01-10 RX ORDER — MONTELUKAST SODIUM 10 MG/1
TAKE 1 TABLET DAILY TABLET, FILM COATED ORAL
Refills: 0 | Status: ACTIVE | COMMUNITY
Start: 2018-10-31

## 2024-01-10 RX ORDER — MELOXICAM 15 MG/1
1 TABLET TABLET ORAL ONCE A DAY
Status: ACTIVE | COMMUNITY

## 2024-01-10 RX ORDER — OLANZAPINE 15 MG/1
1 TABLET TABLET ORAL ONCE A DAY
Status: ACTIVE | COMMUNITY

## 2024-01-10 NOTE — HPI-TODAY'S VISIT:
63 year old female with a history of brain aneurysm x 2 with repair in 2015, adrenal insufficiency, thyroid nodules, GERD, Blanca's esophagus, adenomatous colon polyps, chronic constipation, gastroparesis, post-operative C. difficile, DM, COPD, PTSD, bipolar disorder, and panic attacks with agoraphobia, presenting for follow up. She was last seen in the office in April 2023 for follow-up regarding GERD and recurrent dysphagia previously responsive to dilation of an esophageal stricture.  She was to continue pantoprazole and famotidine.  A repeat EGD with dilation under fluoroscopy was planned.  Her constipation was managed with Linzess. EGD 4/19/2023:Moderate 3 cm hiatal hernia, widely patulous lower esophageal sphincter, status post empiric 54F Savary dilation with mild resistance at the cricopharyngeus, minimally irregular Z-line at 38 cm, mild nonerosive antral gastritis.  Esophageal biopsies were negative for Blanca's esophagus, antral biopsies were negative for H. pylori.  A barium esophagram and cervical spine series were considered if dysphagia persisted.

## 2024-01-10 NOTE — HPI-OTHER HISTORIES
An EGD was performed on 3/17/22. There was a moderate hiatal hernia. No esophageal stricture was evident; she was dilated to 51F using Savary dilation. Regular Z line 39 cm from the incisors. Otherwise, normal EGD to D3. EGD 3/17/21: LA grade B reflux esophagitis limited to the GEJ, mild benign-appearing distal esophageal stricture at the GE junction s/p Savary dilation to 48F, moderate hiatal hernia, two small 3mm David's erosions at the diaphragmatic rim, mild nonerosive antral gastritis. Gastric biopsies showed mild reactive chemical gastropathy, negative for H. pylori. She was instructed to increase pantoprazole 40mg bid and continue nightly famotidine. CT of the abdomen and pelvis with contrast on 6/26/20: left adrenal adenoma, diverticulosis. Labs 6/25/20: CBC, CMP unremarkable. Lipase 15. TSH 0.73. Upper GI series was performed on 1/9/19 and revealed moderate gastroesophageal reflux. EGD 10/3/18: LA grade A reflux esophagitis at 39 cm status post biopsies revealing squamocolumnar mucosa with mild chronic inflammation negative for intestinal metaplasia or dysplasia. Small hiatal hernia. Status post empiric savory dilation to 48 Bruneian. EGD otherwise normal to D2. Colonoscopy (3/23/17): BBPS score 9, a 3 mm ascending colon polyp, a 3 mm rectosigmoid colon polyp, significant looping of colon, moderate internal hemorrhoids.

## 2024-02-14 ENCOUNTER — OV EP (OUTPATIENT)
Dept: URBAN - METROPOLITAN AREA CLINIC 107 | Facility: CLINIC | Age: 64
End: 2024-02-14

## 2024-03-13 ENCOUNTER — LAB (OUTPATIENT)
Dept: URBAN - METROPOLITAN AREA CLINIC 107 | Facility: CLINIC | Age: 64
End: 2024-03-13

## 2024-03-13 ENCOUNTER — OV EP (OUTPATIENT)
Dept: URBAN - METROPOLITAN AREA CLINIC 107 | Facility: CLINIC | Age: 64
End: 2024-03-13
Payer: COMMERCIAL

## 2024-03-13 VITALS
TEMPERATURE: 97.9 F | WEIGHT: 216 LBS | SYSTOLIC BLOOD PRESSURE: 115 MMHG | HEIGHT: 68 IN | HEART RATE: 73 BPM | BODY MASS INDEX: 32.74 KG/M2 | DIASTOLIC BLOOD PRESSURE: 68 MMHG

## 2024-03-13 DIAGNOSIS — K21.9 GERD: ICD-10-CM

## 2024-03-13 DIAGNOSIS — R13.14 PHARYNGOESOPHAGEAL DYSPHAGIA: ICD-10-CM

## 2024-03-13 DIAGNOSIS — K59.04 CHRONIC IDIOPATHIC CONSTIPATION: ICD-10-CM

## 2024-03-13 DIAGNOSIS — Z86.010 HISTORY OF ADENOMATOUS POLYP OF COLON: ICD-10-CM

## 2024-03-13 PROBLEM — 82934008: Status: ACTIVE | Noted: 2024-03-13

## 2024-03-13 PROBLEM — 429047008: Status: ACTIVE | Noted: 2024-03-13

## 2024-03-13 PROCEDURE — 99214 OFFICE O/P EST MOD 30 MIN: CPT | Performed by: INTERNAL MEDICINE

## 2024-03-13 RX ORDER — CLONIDINE HYDROCHLORIDE 0.3 MG/1
1 TABLET TABLET ORAL ONCE A DAY
Status: ACTIVE | COMMUNITY

## 2024-03-13 RX ORDER — PANTOPRAZOLE 40 MG/1
TAKE ONE TABLET BY MOUTH TWICE DAILY TABLET, DELAYED RELEASE ORAL
Qty: 180 TABLET | Refills: 1

## 2024-03-13 RX ORDER — GABAPENTIN 300 MG/1
1 CAPSULE CAPSULE ORAL ONCE A DAY
Status: ON HOLD | COMMUNITY

## 2024-03-13 RX ORDER — GABAPENTIN 300 MG/1
2 CAPSULE CAPSULE ORAL
Status: ACTIVE | COMMUNITY

## 2024-03-13 RX ORDER — FLUTICASONE FUROATE, UMECLIDINIUM BROMIDE AND VILANTEROL TRIFENATATE 100; 62.5; 25 UG/1; UG/1; UG/1
1 PUFF POWDER RESPIRATORY (INHALATION) ONCE A DAY
Refills: 0 | Status: ACTIVE | COMMUNITY

## 2024-03-13 RX ORDER — OLANZAPINE 15 MG/1
1 TABLET TABLET ORAL ONCE A DAY
Status: ACTIVE | COMMUNITY

## 2024-03-13 RX ORDER — FAMOTIDINE 40 MG/1
1 TABLET AT BEDTIME TABLET, FILM COATED ORAL
Qty: 90 | Refills: 1

## 2024-03-13 RX ORDER — LINACLOTIDE 290 UG/1
1 CAPSULE AT LEAST 30 MINUTES BEFORE THE FIRST MEAL OF THE DAY ON AN EMPTY STOMACH CAPSULE, GELATIN COATED ORAL ONCE A DAY
Qty: 90 | Refills: 0 | Status: ACTIVE | COMMUNITY

## 2024-03-13 RX ORDER — LINACLOTIDE 290 UG/1
1 CAPSULE AT LEAST 30 MINUTES BEFORE THE FIRST MEAL OF THE DAY ON AN EMPTY STOMACH CAPSULE, GELATIN COATED ORAL ONCE A DAY
Qty: 90 | Refills: 1

## 2024-03-13 RX ORDER — OXYCODONE HYDROCHLORIDE 10 MG/1
TAKE 1 TABLET EVERY 4 HOURS PRN TABLET ORAL
Refills: 0 | Status: ACTIVE | COMMUNITY
Start: 2018-10-31

## 2024-03-13 RX ORDER — PANTOPRAZOLE 40 MG/1
TAKE ONE TABLET BY MOUTH TWICE DAILY TABLET, DELAYED RELEASE ORAL
Qty: 180 TABLET | Refills: 2 | Status: ACTIVE | COMMUNITY

## 2024-03-13 RX ORDER — LEVOTHYROXINE SODIUM 0.07 MG/1
TAKE 1 TABLET DAILY TABLET ORAL
Refills: 0 | Status: ACTIVE | COMMUNITY

## 2024-03-13 RX ORDER — SERTRALINE HYDROCHLORIDE 25 MG/1
1 TABLET TABLET, FILM COATED ORAL ONCE A DAY
Status: ACTIVE | COMMUNITY

## 2024-03-13 RX ORDER — POTASSIUM CHLORIDE 8 MEQ/1
2 TABLETS WITH FOOD TABLET, FILM COATED, EXTENDED RELEASE ORAL TWICE A DAY
Status: ACTIVE | COMMUNITY

## 2024-03-13 RX ORDER — CLONIDINE HYDROCHLORIDE 0.1 MG/1
1 TABLET TABLET ORAL
Status: ACTIVE | COMMUNITY

## 2024-03-13 RX ORDER — BENRALIZUMAB 30 MG/ML
1 INJECTION INJECTION, SOLUTION SUBCUTANEOUS
Status: ON HOLD | COMMUNITY

## 2024-03-13 RX ORDER — MONTELUKAST SODIUM 10 MG/1
TAKE 1 TABLET DAILY TABLET, FILM COATED ORAL
Refills: 0 | Status: ACTIVE | COMMUNITY
Start: 2018-10-31

## 2024-03-13 RX ORDER — MELOXICAM 15 MG/1
1 TABLET TABLET ORAL ONCE A DAY
Status: ACTIVE | COMMUNITY

## 2024-03-13 RX ORDER — FUROSEMIDE 80 MG/1
1 TABLET TABLET ORAL ONCE A DAY
Status: ACTIVE | COMMUNITY

## 2024-03-13 RX ORDER — FAMOTIDINE 40 MG/1
TAKE ONE (1) TABLET BY MOUTH EVERY NIGHT AT BEDTIME AS DIRECTED TABLET, FILM COATED ORAL
Qty: 30 TABLET | Refills: 5 | Status: ACTIVE | COMMUNITY

## 2024-03-13 RX ORDER — OLANZAPINE 5 MG/1
TAKE 1 TABLET 1 TIMES DAILY TABLET ORAL
Refills: 0 | Status: ACTIVE | COMMUNITY

## 2024-03-13 RX ORDER — MOMETASONE FUROATE AND FORMOTEROL FUMARATE DIHYDRATE 200; 5 UG/1; UG/1
2 PUFFS AEROSOL RESPIRATORY (INHALATION) TWICE A DAY
Status: ON HOLD | COMMUNITY

## 2024-03-13 RX ORDER — CARVEDILOL 25 MG/1
1 TABLET WITH FOOD TABLET, FILM COATED ORAL TWICE A DAY
Status: ACTIVE | COMMUNITY

## 2024-03-13 NOTE — HPI-TODAY'S VISIT:
63-year-old female with a history of brain aneurysm x 2 with repair in 2015, adrenal insufficiency, thyroid nodules, GERD, Blanca's esophagus, adenomatous colon polyps, chronic constipation, gastroparesis, post-operative C. difficile, DM, COPD on 02 4L at night with C-Pap and during the day as needed, PTSD, bipolar disorder, and panic attacks with agoraphobia, presenting for follow up.  She was last seen in the office in April 2023 for follow-up regarding GERD and recurrent dysphagia previously responsive to dilation of an esophageal stricture.  She was to continue pantoprazole and famotidine.  A repeat EGD with dilation under fluoroscopy was planned.  Her constipation was managed with Linzess. A barium esophagram and cervical spine series were considered if dysphagia persisted.  Today she is doing fair. She is out of her Linzess 290.Last BM was 3 days ago. No melena or hematochezia.  Persistent GERD on PPI BID and famotidine at bedtime.  Has dysphagia to solids and liquids. Vomited up coffee last week. On Mobic and hydrocodone daily. SOB unchanged from previous.   No CP, palpitations, syncope, near-syncope or problems with anesthesia previously.

## 2024-03-13 NOTE — HPI-OTHER HISTORIES
Imaging:  -Upper GI series was performed on 1/9/19 and revealed moderate gastroesophageal reflux. -CT of the abdomen and pelvis with contrast on 6/26/20: left adrenal adenoma, diverticulosis.  . Procedures:  -Colonoscopy (3/23/17): BBPS score 9, a 3 mm ascending colon polyp, a 3 mm rectosigmoid colon polyp, significant looping of colon, moderate internal hemorrhoids. One polyp was TA and the other was hyperplastic.   -EGD 10/3/18: LA grade A reflux esophagitis at 39 cm status post biopsies revealing squamocolumnar mucosa with mild chronic inflammation negative for intestinal metaplasia or dysplasia. Small hiatal hernia. Status post empiric savory dilation to 48 Cook Islander. EGD otherwise normal to D2. -EGD 3/17/21: LA grade B reflux esophagitis limited to the GEJ, mild benign-appearing distal esophageal stricture at the GE junction s/p Savary dilation to 48F, moderate hiatal hernia, two small 3mm David's erosions at the diaphragmatic rim, mild nonerosive antral gastritis. Gastric biopsies showed mild reactive chemical gastropathy, negative for H. pylori. She was instructed to increase pantoprazole 40mg bid and continue nightly famotidine. -EGD was performed on 3/17/22. There was a moderate hiatal hernia. No esophageal stricture was evident; she was dilated to 51F using Savary dilation. Regular Z line 39 cm from the incisors. Otherwise, normal EGD to D3. -EGD 4/19/2023:Moderate 3 cm hiatal hernia, widely patulous lower esophageal sphincter, status post empiric 54F Savary dilation with mild resistance at the cricopharyngeus, minimally irregular Z-line at 38 cm, mild nonerosive antral gastritis. Esophageal biopsies were negative for Blanca's esophagus, antral biopsies were negative for H. pylori.  -EGD 4/19/2023 medium size hiatal hernia 3 cm.  Widely patulous lower esophageal sphincter.  Empiric dilatation with 54 Cook Islander savory dilator performed.  Mild nonerosive antral gastritis.  EGD otherwise normal.  Esophageal biopsy revealed mild chronic inflammation.  Stomach biopsy revealed reactive gastropathy no H. pylori or IM. .  Labs 11/30/2023.  CBC: Hgb 14.6, HCT 46.6, .  BMP: BUN 24, glucose 115, GFR 45.

## 2024-04-10 ENCOUNTER — COL/EGD (OUTPATIENT)
Dept: URBAN - METROPOLITAN AREA MEDICAL CENTER 19 | Facility: MEDICAL CENTER | Age: 64
End: 2024-04-10

## 2024-04-10 RX ORDER — MELOXICAM 15 MG/1
1 TABLET TABLET ORAL ONCE A DAY
Status: ACTIVE | COMMUNITY

## 2024-04-10 RX ORDER — FUROSEMIDE 80 MG/1
1 TABLET TABLET ORAL ONCE A DAY
Status: ACTIVE | COMMUNITY

## 2024-04-10 RX ORDER — GABAPENTIN 300 MG/1
1 CAPSULE CAPSULE ORAL ONCE A DAY
Status: ON HOLD | COMMUNITY

## 2024-04-10 RX ORDER — PANTOPRAZOLE 40 MG/1
TAKE ONE TABLET BY MOUTH TWICE DAILY TABLET, DELAYED RELEASE ORAL
Qty: 180 TABLET | Refills: 1 | Status: ACTIVE | COMMUNITY

## 2024-04-10 RX ORDER — LINACLOTIDE 290 UG/1
1 CAPSULE AT LEAST 30 MINUTES BEFORE THE FIRST MEAL OF THE DAY ON AN EMPTY STOMACH CAPSULE, GELATIN COATED ORAL ONCE A DAY
Qty: 90 | Refills: 1 | Status: ACTIVE | COMMUNITY

## 2024-04-10 RX ORDER — CLONIDINE HYDROCHLORIDE 0.1 MG/1
1 TABLET TABLET ORAL
Status: ACTIVE | COMMUNITY

## 2024-04-10 RX ORDER — FLUTICASONE FUROATE, UMECLIDINIUM BROMIDE AND VILANTEROL TRIFENATATE 100; 62.5; 25 UG/1; UG/1; UG/1
1 PUFF POWDER RESPIRATORY (INHALATION) ONCE A DAY
Refills: 0 | Status: ACTIVE | COMMUNITY

## 2024-04-10 RX ORDER — FAMOTIDINE 40 MG/1
1 TABLET AT BEDTIME TABLET, FILM COATED ORAL
Qty: 90 | Refills: 1 | Status: ACTIVE | COMMUNITY

## 2024-04-10 RX ORDER — GABAPENTIN 300 MG/1
2 CAPSULE CAPSULE ORAL
Status: ACTIVE | COMMUNITY

## 2024-04-10 RX ORDER — BENRALIZUMAB 30 MG/ML
1 INJECTION INJECTION, SOLUTION SUBCUTANEOUS
Status: ON HOLD | COMMUNITY

## 2024-04-10 RX ORDER — OLANZAPINE 5 MG/1
TAKE 1 TABLET 1 TIMES DAILY TABLET ORAL
Refills: 0 | Status: ACTIVE | COMMUNITY

## 2024-04-10 RX ORDER — LEVOTHYROXINE SODIUM 0.07 MG/1
TAKE 1 TABLET DAILY TABLET ORAL
Refills: 0 | Status: ACTIVE | COMMUNITY

## 2024-04-10 RX ORDER — CLONIDINE HYDROCHLORIDE 0.3 MG/1
1 TABLET TABLET ORAL ONCE A DAY
Status: ACTIVE | COMMUNITY

## 2024-04-10 RX ORDER — CARVEDILOL 25 MG/1
1 TABLET WITH FOOD TABLET, FILM COATED ORAL TWICE A DAY
Status: ACTIVE | COMMUNITY

## 2024-04-10 RX ORDER — OLANZAPINE 15 MG/1
1 TABLET TABLET ORAL ONCE A DAY
Status: ACTIVE | COMMUNITY

## 2024-04-10 RX ORDER — SERTRALINE HYDROCHLORIDE 25 MG/1
1 TABLET TABLET, FILM COATED ORAL ONCE A DAY
Status: ACTIVE | COMMUNITY

## 2024-04-10 RX ORDER — OXYCODONE HYDROCHLORIDE 10 MG/1
TAKE 1 TABLET EVERY 4 HOURS PRN TABLET ORAL
Refills: 0 | Status: ACTIVE | COMMUNITY
Start: 2018-10-31

## 2024-04-10 RX ORDER — MONTELUKAST SODIUM 10 MG/1
TAKE 1 TABLET DAILY TABLET, FILM COATED ORAL
Refills: 0 | Status: ACTIVE | COMMUNITY
Start: 2018-10-31

## 2024-04-10 RX ORDER — MOMETASONE FUROATE AND FORMOTEROL FUMARATE DIHYDRATE 200; 5 UG/1; UG/1
2 PUFFS AEROSOL RESPIRATORY (INHALATION) TWICE A DAY
Status: ON HOLD | COMMUNITY

## 2024-04-10 RX ORDER — POTASSIUM CHLORIDE 8 MEQ/1
2 TABLETS WITH FOOD TABLET, FILM COATED, EXTENDED RELEASE ORAL TWICE A DAY
Status: ACTIVE | COMMUNITY

## 2024-05-08 ENCOUNTER — OFFICE VISIT (OUTPATIENT)
Dept: URBAN - METROPOLITAN AREA CLINIC 107 | Facility: CLINIC | Age: 64
End: 2024-05-08

## 2024-05-08 RX ORDER — SERTRALINE HYDROCHLORIDE 25 MG/1
1 TABLET TABLET, FILM COATED ORAL ONCE A DAY
Status: ACTIVE | COMMUNITY

## 2024-05-08 RX ORDER — OLANZAPINE 5 MG/1
TAKE 1 TABLET 1 TIMES DAILY TABLET ORAL
Refills: 0 | Status: ACTIVE | COMMUNITY

## 2024-05-08 RX ORDER — CLONIDINE HYDROCHLORIDE 0.1 MG/1
1 TABLET TABLET ORAL
Status: ACTIVE | COMMUNITY

## 2024-05-08 RX ORDER — FLUTICASONE FUROATE, UMECLIDINIUM BROMIDE AND VILANTEROL TRIFENATATE 100; 62.5; 25 UG/1; UG/1; UG/1
1 PUFF POWDER RESPIRATORY (INHALATION) ONCE A DAY
Refills: 0 | Status: ACTIVE | COMMUNITY

## 2024-05-08 RX ORDER — PANTOPRAZOLE 40 MG/1
TAKE ONE TABLET BY MOUTH TWICE DAILY TABLET, DELAYED RELEASE ORAL
Qty: 180 TABLET | Refills: 1 | Status: ACTIVE | COMMUNITY

## 2024-05-08 RX ORDER — GABAPENTIN 300 MG/1
1 CAPSULE CAPSULE ORAL ONCE A DAY
Status: ON HOLD | COMMUNITY

## 2024-05-08 RX ORDER — FAMOTIDINE 40 MG/1
1 TABLET AT BEDTIME TABLET, FILM COATED ORAL
Qty: 90 | Refills: 1 | Status: ACTIVE | COMMUNITY

## 2024-05-08 RX ORDER — MONTELUKAST SODIUM 10 MG/1
TAKE 1 TABLET DAILY TABLET, FILM COATED ORAL
Refills: 0 | Status: ACTIVE | COMMUNITY
Start: 2018-10-31

## 2024-05-08 RX ORDER — CLONIDINE HYDROCHLORIDE 0.3 MG/1
1 TABLET TABLET ORAL ONCE A DAY
Status: ACTIVE | COMMUNITY

## 2024-05-08 RX ORDER — MOMETASONE FUROATE AND FORMOTEROL FUMARATE DIHYDRATE 200; 5 UG/1; UG/1
2 PUFFS AEROSOL RESPIRATORY (INHALATION) TWICE A DAY
Status: ON HOLD | COMMUNITY

## 2024-05-08 RX ORDER — POTASSIUM CHLORIDE 8 MEQ/1
2 TABLETS WITH FOOD TABLET, FILM COATED, EXTENDED RELEASE ORAL TWICE A DAY
Status: ACTIVE | COMMUNITY

## 2024-05-08 RX ORDER — FUROSEMIDE 80 MG/1
1 TABLET TABLET ORAL ONCE A DAY
Status: ACTIVE | COMMUNITY

## 2024-05-08 RX ORDER — OLANZAPINE 15 MG/1
1 TABLET TABLET ORAL ONCE A DAY
Status: ACTIVE | COMMUNITY

## 2024-05-08 RX ORDER — LINACLOTIDE 290 UG/1
1 CAPSULE AT LEAST 30 MINUTES BEFORE THE FIRST MEAL OF THE DAY ON AN EMPTY STOMACH CAPSULE, GELATIN COATED ORAL ONCE A DAY
Qty: 90 | Refills: 1 | Status: ACTIVE | COMMUNITY

## 2024-05-08 RX ORDER — CARVEDILOL 25 MG/1
1 TABLET WITH FOOD TABLET, FILM COATED ORAL TWICE A DAY
Status: ACTIVE | COMMUNITY

## 2024-05-08 RX ORDER — OXYCODONE HYDROCHLORIDE 10 MG/1
TAKE 1 TABLET EVERY 4 HOURS PRN TABLET ORAL
Refills: 0 | Status: ACTIVE | COMMUNITY
Start: 2018-10-31

## 2024-05-08 RX ORDER — LEVOTHYROXINE SODIUM 0.07 MG/1
TAKE 1 TABLET DAILY TABLET ORAL
Refills: 0 | Status: ACTIVE | COMMUNITY

## 2024-05-08 RX ORDER — GABAPENTIN 300 MG/1
2 CAPSULE CAPSULE ORAL
Status: ACTIVE | COMMUNITY

## 2024-05-08 RX ORDER — BENRALIZUMAB 30 MG/ML
1 INJECTION INJECTION, SOLUTION SUBCUTANEOUS
Status: ON HOLD | COMMUNITY

## 2024-05-08 RX ORDER — MELOXICAM 15 MG/1
1 TABLET TABLET ORAL ONCE A DAY
Status: ACTIVE | COMMUNITY

## 2024-06-19 ENCOUNTER — OFFICE VISIT (OUTPATIENT)
Dept: URBAN - METROPOLITAN AREA MEDICAL CENTER 19 | Facility: MEDICAL CENTER | Age: 64
End: 2024-06-19

## 2024-06-19 RX ORDER — SERTRALINE HYDROCHLORIDE 25 MG/1
1 TABLET TABLET, FILM COATED ORAL ONCE A DAY
Status: ACTIVE | COMMUNITY

## 2024-06-19 RX ORDER — MELOXICAM 15 MG/1
1 TABLET TABLET ORAL ONCE A DAY
Status: ACTIVE | COMMUNITY

## 2024-06-19 RX ORDER — LINACLOTIDE 290 UG/1
1 CAPSULE AT LEAST 30 MINUTES BEFORE THE FIRST MEAL OF THE DAY ON AN EMPTY STOMACH CAPSULE, GELATIN COATED ORAL ONCE A DAY
Qty: 90 | Refills: 1 | Status: ACTIVE | COMMUNITY

## 2024-06-19 RX ORDER — OXYCODONE HYDROCHLORIDE 10 MG/1
TAKE 1 TABLET EVERY 4 HOURS PRN TABLET ORAL
Refills: 0 | Status: ACTIVE | COMMUNITY
Start: 2018-10-31

## 2024-06-19 RX ORDER — LEVOTHYROXINE SODIUM 0.07 MG/1
TAKE 1 TABLET DAILY TABLET ORAL
Refills: 0 | Status: ACTIVE | COMMUNITY

## 2024-06-19 RX ORDER — FLUTICASONE FUROATE, UMECLIDINIUM BROMIDE AND VILANTEROL TRIFENATATE 100; 62.5; 25 UG/1; UG/1; UG/1
1 PUFF POWDER RESPIRATORY (INHALATION) ONCE A DAY
Refills: 0 | Status: ACTIVE | COMMUNITY

## 2024-06-19 RX ORDER — MONTELUKAST SODIUM 10 MG/1
TAKE 1 TABLET DAILY TABLET, FILM COATED ORAL
Refills: 0 | Status: ACTIVE | COMMUNITY
Start: 2018-10-31

## 2024-06-19 RX ORDER — CLONIDINE HYDROCHLORIDE 0.3 MG/1
1 TABLET TABLET ORAL ONCE A DAY
Status: ACTIVE | COMMUNITY

## 2024-06-19 RX ORDER — POTASSIUM CHLORIDE 8 MEQ/1
2 TABLETS WITH FOOD TABLET, FILM COATED, EXTENDED RELEASE ORAL TWICE A DAY
Status: ACTIVE | COMMUNITY

## 2024-06-19 RX ORDER — OLANZAPINE 15 MG/1
1 TABLET TABLET ORAL ONCE A DAY
Status: ACTIVE | COMMUNITY

## 2024-06-19 RX ORDER — FAMOTIDINE 40 MG/1
1 TABLET AT BEDTIME TABLET, FILM COATED ORAL
Qty: 90 | Refills: 1 | Status: ACTIVE | COMMUNITY

## 2024-06-19 RX ORDER — CLONIDINE HYDROCHLORIDE 0.1 MG/1
1 TABLET TABLET ORAL
Status: ACTIVE | COMMUNITY

## 2024-06-19 RX ORDER — CARVEDILOL 25 MG/1
1 TABLET WITH FOOD TABLET, FILM COATED ORAL TWICE A DAY
Status: ACTIVE | COMMUNITY

## 2024-06-19 RX ORDER — BENRALIZUMAB 30 MG/ML
1 INJECTION INJECTION, SOLUTION SUBCUTANEOUS
Status: ON HOLD | COMMUNITY

## 2024-06-19 RX ORDER — MOMETASONE FUROATE AND FORMOTEROL FUMARATE DIHYDRATE 200; 5 UG/1; UG/1
2 PUFFS AEROSOL RESPIRATORY (INHALATION) TWICE A DAY
Status: ON HOLD | COMMUNITY

## 2024-06-19 RX ORDER — FUROSEMIDE 80 MG/1
1 TABLET TABLET ORAL ONCE A DAY
Status: ACTIVE | COMMUNITY

## 2024-06-19 RX ORDER — OLANZAPINE 5 MG/1
TAKE 1 TABLET 1 TIMES DAILY TABLET ORAL
Refills: 0 | Status: ACTIVE | COMMUNITY

## 2024-06-19 RX ORDER — GABAPENTIN 300 MG/1
1 CAPSULE CAPSULE ORAL ONCE A DAY
Status: ON HOLD | COMMUNITY

## 2024-06-19 RX ORDER — GABAPENTIN 300 MG/1
2 CAPSULE CAPSULE ORAL
Status: ACTIVE | COMMUNITY

## 2024-06-19 RX ORDER — PANTOPRAZOLE 40 MG/1
TAKE ONE TABLET BY MOUTH TWICE DAILY TABLET, DELAYED RELEASE ORAL
Qty: 180 TABLET | Refills: 1 | Status: ACTIVE | COMMUNITY

## 2024-09-09 ENCOUNTER — ERX REFILL RESPONSE (OUTPATIENT)
Dept: URBAN - METROPOLITAN AREA CLINIC 72 | Facility: CLINIC | Age: 64
End: 2024-09-09

## 2024-09-09 RX ORDER — LINACLOTIDE 290 UG/1
1 CAPSULE AT LEAST 30 MINUTES BEFORE THE FIRST MEAL OF THE DAY ON AN EMPTY STOMACH CAPSULE, GELATIN COATED ORAL ONCE A DAY
Qty: 90 | Refills: 1 | OUTPATIENT

## 2024-09-09 RX ORDER — LINACLOTIDE 290 UG/1
TAKE ONE (1) CAPSULE BY MOUTH EVERY DAY AT LEAST 30 MINUTES BEFORE THE FIRST MEAL OF THE DAY ON AN EMPTY STOMACH CAPSULE, GELATIN COATED ORAL
Qty: 90 CAPSULE | Refills: 1 | OUTPATIENT

## 2024-09-09 RX ORDER — FAMOTIDINE 40 MG/1
1 TABLET AT BEDTIME TABLET, FILM COATED ORAL
Qty: 90 | Refills: 1 | OUTPATIENT

## 2025-01-01 ENCOUNTER — ERX REFILL RESPONSE (OUTPATIENT)
Dept: URBAN - METROPOLITAN AREA CLINIC 72 | Facility: CLINIC | Age: 65
End: 2025-01-01

## 2025-01-01 RX ORDER — PANTOPRAZOLE 40 MG/1
TAKE ONE TABLET BY MOUTH TWICE DAILY TABLET, DELAYED RELEASE ORAL
Qty: 180 TABLET | Refills: 1 | OUTPATIENT

## 2025-02-26 ENCOUNTER — ERX REFILL RESPONSE (OUTPATIENT)
Dept: URBAN - METROPOLITAN AREA CLINIC 72 | Facility: CLINIC | Age: 65
End: 2025-02-26

## 2025-02-26 RX ORDER — LINACLOTIDE 290 UG/1
TAKE ONE (1) CAPSULE BY MOUTH EVERY DAY AT LEAST 30 MINUTES BEFORE THE FIRST MEAL OF THE DAY ON AN EMPTY STOMACH CAPSULE, GELATIN COATED ORAL
Qty: 90 CAPSULE | Refills: 1 | OUTPATIENT

## 2025-02-26 RX ORDER — FAMOTIDINE 40 MG/1
1 TABLET AT BEDTIME TABLET, FILM COATED ORAL
Qty: 90 | Refills: 1 | OUTPATIENT

## 2025-07-02 ENCOUNTER — ERX REFILL RESPONSE (OUTPATIENT)
Dept: URBAN - METROPOLITAN AREA CLINIC 72 | Facility: CLINIC | Age: 65
End: 2025-07-02

## 2025-07-02 RX ORDER — PANTOPRAZOLE SODIUM 40 MG/1
TAKE ONE TABLET BY MOUTH TWICE DAILY TABLET, DELAYED RELEASE ORAL
Qty: 180 TABLET | Refills: 1

## 2025-08-26 ENCOUNTER — ERX REFILL RESPONSE (OUTPATIENT)
Dept: URBAN - METROPOLITAN AREA CLINIC 72 | Facility: CLINIC | Age: 65
End: 2025-08-26

## 2025-08-26 RX ORDER — FAMOTIDINE 40 MG/1
TAKE ONE (1) TABLET BY MOUTH EVERY NIGHT AT BEDTIME TABLET ORAL
Qty: 90 TABLET | Refills: 0

## 2025-08-26 RX ORDER — LINACLOTIDE 290 UG/1
TAKE ONE (1) CAPSULE BY MOUTH EVERY DAY ON EMPTY STOMACH AT LEAST 30 MINUTES BEFORE FIRST MEAL OF THE DAY CAPSULE, GELATIN COATED ORAL
Qty: 90 CAPSULE | Refills: 0